# Patient Record
Sex: FEMALE | Race: OTHER | HISPANIC OR LATINO | Employment: OTHER | URBAN - METROPOLITAN AREA
[De-identification: names, ages, dates, MRNs, and addresses within clinical notes are randomized per-mention and may not be internally consistent; named-entity substitution may affect disease eponyms.]

---

## 2017-05-08 ENCOUNTER — OFFICE VISIT (OUTPATIENT)
Dept: MEDICAL GROUP | Facility: PHYSICIAN GROUP | Age: 47
End: 2017-05-08
Payer: COMMERCIAL

## 2017-05-08 VITALS
SYSTOLIC BLOOD PRESSURE: 120 MMHG | HEART RATE: 81 BPM | RESPIRATION RATE: 16 BRPM | OXYGEN SATURATION: 97 % | TEMPERATURE: 97.5 F | HEIGHT: 62 IN | WEIGHT: 150 LBS | DIASTOLIC BLOOD PRESSURE: 60 MMHG | BODY MASS INDEX: 27.6 KG/M2

## 2017-05-08 DIAGNOSIS — N60.02 BILATERAL BREAST CYSTS: ICD-10-CM

## 2017-05-08 DIAGNOSIS — Z13.6 SCREENING FOR CARDIOVASCULAR CONDITION: ICD-10-CM

## 2017-05-08 DIAGNOSIS — K64.9 HEMORRHOIDS, UNSPECIFIED HEMORRHOID TYPE: ICD-10-CM

## 2017-05-08 DIAGNOSIS — Z00.00 ANNUAL PHYSICAL EXAM: ICD-10-CM

## 2017-05-08 DIAGNOSIS — N39.3 STRESS INCONTINENCE: ICD-10-CM

## 2017-05-08 DIAGNOSIS — N60.01 BILATERAL BREAST CYSTS: ICD-10-CM

## 2017-05-08 LAB
APPEARANCE UR: CLEAR
BILIRUB UR STRIP-MCNC: NORMAL MG/DL
COLOR UR AUTO: CLEAR
GLUCOSE UR STRIP.AUTO-MCNC: NORMAL MG/DL
KETONES UR STRIP.AUTO-MCNC: NORMAL MG/DL
LEUKOCYTE ESTERASE UR QL STRIP.AUTO: NORMAL
NITRITE UR QL STRIP.AUTO: NORMAL
PH UR STRIP.AUTO: 7 [PH] (ref 5–8)
PROT UR QL STRIP: NORMAL MG/DL
RBC UR QL AUTO: NORMAL
SP GR UR STRIP.AUTO: 1
UROBILINOGEN UR STRIP-MCNC: NORMAL MG/DL

## 2017-05-08 PROCEDURE — 81002 URINALYSIS NONAUTO W/O SCOPE: CPT | Performed by: FAMILY MEDICINE

## 2017-05-08 PROCEDURE — 99396 PREV VISIT EST AGE 40-64: CPT | Performed by: FAMILY MEDICINE

## 2017-05-08 ASSESSMENT — PATIENT HEALTH QUESTIONNAIRE - PHQ9: CLINICAL INTERPRETATION OF PHQ2 SCORE: 0

## 2017-05-08 NOTE — MR AVS SNAPSHOT
"        Palak Gomez   2017 9:20 AM   Office Visit   MRN: [id number]    Department:  [facility]   Dept Phone:  [phone]    Description:  Female : 1970   Provider:  Pina Moran D.O.           Reason for Visit     Establish Care NU2U    Referral Needed Mammo      Allergies as of 2017     No Known Allergies      You were diagnosed with     Annual physical exam   [078735]       Stress incontinence   [261849]       Hemorrhoids, unspecified hemorrhoid type   [7992480]       Bilateral breast cysts   [6828505]       Encounter for screening mammogram for malignant neoplasm of breast   [487048]       Screening for cardiovascular condition   [149661]         Vital Signs     Blood Pressure Pulse Temperature Respirations Height Weight    120/60 mmHg 81 36.4 °C (97.5 °F) 16 1.575 m (5' 2.01\") 68.04 kg (150 lb)    Body Mass Index Oxygen Saturation Smoking Status             27.43 kg/m2 97% Never Smoker          Basic Information     Date Of Birth Sex Race Ethnicity Preferred Language    1970 Female  or  Unknown English      Problem List              ICD-10-CM Priority Class Noted - Resolved    Bilateral breast cysts N60.01, N60.02   2011 - Present    Gastritis K29.70   2014 - Present      Health Maintenance        Date Due Completion Dates    IMM DTaP/Tdap/Td Vaccine (1 - Tdap) 1988 ---    MAMMOGRAM 2017, 2014, 2013, 2012    PAP SMEAR 2019 (N/S), 10/24/2013 (Done)    Override on 2016: (N/S)    Override on 10/24/2013: Done (normal at [facility])            Results     POCT Urinalysis      Component Value Standard Range & Units    POC Color Clear Negative    POC Appearance Clear Negative    POC Leukocyte Esterase Neg Negative    POC Nitrites Neg Negative    POC Urobiligen Neg Negative (0.2) mg/dL    POC Protein Neg Negative mg/dL    POC Urine PH 7.0 5.0 - 8.0    POC Blood Trace Negative    POC Specific Gravity 1.00 " <1.005 - >1.030    POC Ketones Neg Negative mg/dL    POC Biliruben Neg Negative mg/dL    POC Glucose Neg Negative mg/dL                        Current Immunizations     No immunizations on file.      Below and/or attached are the medications your provider expects you to take. Review all of your home medications and newly ordered medications with your provider and/or pharmacist. Follow medication instructions as directed by your provider and/or pharmacist. Please keep your medication list with you and share with your provider. Update the information when medications are discontinued, doses are changed, or new medications (including over-the-counter products) are added; and carry medication information at all times in the event of emergency situations     Allergies:  No Known Allergies          Medications  Valid as of: May 08, 2017 - 10:33 AM    Generic Name Brand Name Tablet Size Instructions for use    .                 Medicines prescribed today were sent to:     [id number] - [place] - [address] [place] [address]    Phone: [phone] Fax: [phone]    Open 24 Hours?: No      Medication refill instructions:       If your prescription bottle indicates you have medication refills left, it is not necessary to call your provider’s office. Please contact your pharmacy and they will refill your medication.    If your prescription bottle indicates you do not have any refills left, you may request refills at any time through one of the following ways: The online [employer] system (except Urgent Care), by calling your provider’s office, or by asking your pharmacy to contact your provider’s office with a refill request. Medication refills are processed only during regular business hours and may not be available until the next business day. Your provider may request additional information or to have a follow-up visit with you prior to refilling your medication.   *Please Note: Medication refills  are assigned a new Rx number when refilled electronically. Your pharmacy may indicate that no refills were authorized even though a new prescription for the same medication is available at the pharmacy. Please request the medicine by name with the pharmacy before contacting your provider for a refill.        Your To Do List     Future Labs/Procedures Complete By Expires    COMP METABOLIC PANEL  As directed 5/8/2018    LIPID PROFILE  As directed 5/8/2018    MA-DIAGNOSTIC MAMMO W/CAD-BILAT  As directed 5/8/2018      Referral     A referral request has been sent to our patient care coordination department. Please allow 3-5 business days for us to process this request and contact you either by phone or mail. If you do not hear from us by the 5th business day, please call us at [phone].           MyChart Status: Patient Declined

## 2017-05-08 NOTE — PROGRESS NOTES
Subjective:     Chief Complaint   Patient presents with   • Establish Care     NU2U   • Referral Needed     Mammo       Palak Gomez is a 46 y.o. female here today for annual exam.    Pt reports hx of stress incontinence.  Pt reports it has been worsening for the past few days. Patient denies dysuria, urinary frequency, hematuria, change in urine color, malodorous urine, flank pain, suprapubic pain,  fevers or chills.    Pt also reports hemorrhoid that itched without pain or blood.  Pt reports straining with BM occasionally. Patient denies constipation, diarrhea, painful defecation.    LMP: several months prior. No spotting. Hot flashes at night for a few months, but have resolved.     No Known Allergies  Current medicines (including changes today)  No current outpatient prescriptions on file.     No current facility-administered medications for this visit.     Social History   Substance Use Topics   • Smoking status: Never Smoker    • Smokeless tobacco: Never Used      Comment: avid all tobacco products   • Alcohol Use: No     No family status information on file.     Family History   Problem Relation Age of Onset   • Diabetes Neg Hx    • Heart Disease Neg Hx    • Cancer Neg Hx    • Stroke Neg Hx      She    has a past medical history of Gastritis (11/20/2014) and Bilateral breast cysts (6/1/2011). She also has no past medical history of Breast cancer (CMS-[facility]).        ROS   GEN: no weight loss, fevers, or chills  HEENT: no blurry vision or change in vision, no ear pain, no difficulty swallowing, no throat pain, no runny nose, no nasal congestion  Resp: no shortness of breath, no cough  CV: no racing heart, no irregular beats, no chest pain  Abd: no nausea, no vomiting, no diarrhea, no constipation, no blood in stool, no dark stools, no incontinence  : no dysuria, no hematuria, + urinary incontinence, no increased frequency  MSK: no muscle aches, no joint pain, no limited motion  Neuro: no headaches, no  "dizziness, no LOC, no weakness, no numbness/tingling       Objective:     Blood pressure 120/60, pulse 81, temperature 36.4 °C (97.5 °F), resp. rate 16, height 1.575 m (5' 2.01\"), weight 68.04 kg (150 lb), SpO2 97 %. Body mass index is 27.43 kg/(m^2).   Physical Exam:  Constitutional: Alert, no distress.  Skin: Warm, dry, good turgor, no rashes in visible areas.  Eye: Equal, round and reactive, conjunctiva clear, lids normal.  ENMT: Lips without lesions, oropharynx non-erythematous, no exudate, moist oral mucosa, bilateral tympanic membranes: No bulging, no retraction, no fluid, nonerythematous, positive light reflex, external auditory canals: Clear, scant cerumen, nonerythematous  Neck: Trachea midline, no masses, no thyromegaly. No cervical or supraclavicular lymphadenopathy. Full ROM  Respiratory: Unlabored respiratory effort, good air movement, lungs clear to auscultation, no wheezes, no ronchi.  Cardiovascular:RRR, +S1, S2, no murmur, no peripheral edema, pedal and radial pulses equal and intact bilat  Abdomen: Soft, non-tender, no masses, no hepatosplenomegaly, no suprapubic tenderness, no CVA tenderness  Rectal: No bleeding, no discharge, no fissures, +hemorrhoid  MSK:5/5 muscle strength in upper extremities as well as lower extremity bilaterally  Psych: Alert and oriented x3, appropriate affect and mood.  Neuro: CN2-12 intact, no gross motor or sensory deficits      Assessment and Plan:   The following treatment plan was discussed    1. Annual physical exam  - COMP METABOLIC PANEL; Future    2. Stress incontinence  Lab Results   Component Value Date/Time    POC COLOR Clear 05/08/2017 09:52 AM    POC APPEARANCE Clear 05/08/2017 09:52 AM    POC LEUKOCYTE ESTERASE Neg 05/08/2017 09:52 AM    POC NITRITES Neg 05/08/2017 09:52 AM    POC UROBILIGEN Neg 05/08/2017 09:52 AM    POC PROTEIN Neg 05/08/2017 09:52 AM    POC URINE PH 7.0 05/08/2017 09:52 AM    POC BLOOD Trace 05/08/2017 09:52 AM    POC SPECIFIC GRAVITY " 1.00 05/08/2017 09:52 AM    POC KETONES Neg 05/08/2017 09:52 AM    POC BILIRUBEN Neg 05/08/2017 09:52 AM    POC GLUCOSE Neg 05/08/2017 09:52 AM      Trace blood, therefore we will send for culture. If no bacteria recommended repeat UA to reevaluate hematuria. No painful urination, no flank pain, no CVA tenderness. Therefore, nephrolithiasis unlikely patient given home exercises for bladder wall strengthening. Also advised patient to urinate every 2 hours.  - POCT Urinalysis  - COMP METABOLIC PANEL; Future    3. Hemorrhoids, unspecified hemorrhoid type  Patient reports present for many years and requests surgical intervention.  - REFERRAL TO GENERAL SURGERY    4. Bilateral breast cysts  Chronic: Recommend repeat mammogram    5. Encounter for screening mammogram for malignant neoplasm of breast  - MA-DIAGNOSTIC MAMMO W/CAD-BILAT; Future    6. Screening for cardiovascular condition  - LIPID PROFILE; Future      Followup: Return in about 1 year (around 5/8/2018) for Annual.    Please note that this dictation was created using voice recognition software. I have made every reasonable attempt to correct obvious errors, but I expect that there are errors of grammar and possibly content that I did not discover before finalizing the note.

## 2017-05-09 ENCOUNTER — HOSPITAL ENCOUNTER (OUTPATIENT)
Facility: MEDICAL CENTER | Age: 47
End: 2017-05-09
Attending: FAMILY MEDICINE
Payer: COMMERCIAL

## 2017-05-09 DIAGNOSIS — R31.9 HEMATURIA: ICD-10-CM

## 2017-05-09 PROCEDURE — 87086 URINE CULTURE/COLONY COUNT: CPT

## 2017-05-12 ENCOUNTER — TELEPHONE (OUTPATIENT)
Dept: MEDICAL GROUP | Facility: PHYSICIAN GROUP | Age: 47
End: 2017-05-12

## 2017-05-12 LAB
BACTERIA UR CULT: NORMAL
SIGNIFICANT IND 70042: NORMAL
SOURCE SOURCE: NORMAL

## 2017-05-12 NOTE — TELEPHONE ENCOUNTER
----- Message from Tequila Patton M.D. sent at 5/12/2017  9:12 AM PDT -----  Please advise patient that no infection was identified on her urine culture. Her symptoms of incontinence could instead indicate a vaginal infection. I recommend that she schedule a pelvic exam if she continues to have symptoms   Tequila Patton M.D. covering for Pina Moran D.O.

## 2017-05-26 ENCOUNTER — HOSPITAL ENCOUNTER (OUTPATIENT)
Dept: LAB | Facility: MEDICAL CENTER | Age: 47
End: 2017-05-26
Attending: FAMILY MEDICINE
Payer: COMMERCIAL

## 2017-05-26 DIAGNOSIS — Z13.6 SCREENING FOR CARDIOVASCULAR CONDITION: ICD-10-CM

## 2017-05-26 DIAGNOSIS — N39.3 STRESS INCONTINENCE: ICD-10-CM

## 2017-05-26 DIAGNOSIS — Z00.00 ANNUAL PHYSICAL EXAM: ICD-10-CM

## 2017-05-26 LAB
ALBUMIN SERPL BCP-MCNC: 4.2 G/DL (ref 3.2–4.9)
ALBUMIN/GLOB SERPL: 1.2 G/DL
ALP SERPL-CCNC: 43 U/L (ref 30–99)
ALT SERPL-CCNC: 19 U/L (ref 2–50)
ANION GAP SERPL CALC-SCNC: 6 MMOL/L (ref 0–11.9)
AST SERPL-CCNC: 18 U/L (ref 12–45)
BILIRUB SERPL-MCNC: 1.4 MG/DL (ref 0.1–1.5)
BUN SERPL-MCNC: 11 MG/DL (ref 8–22)
CALCIUM SERPL-MCNC: 9.4 MG/DL (ref 8.5–10.5)
CHLORIDE SERPL-SCNC: 103 MMOL/L (ref 96–112)
CHOLEST SERPL-MCNC: 151 MG/DL (ref 100–199)
CO2 SERPL-SCNC: 29 MMOL/L (ref 20–33)
CREAT SERPL-MCNC: 0.58 MG/DL (ref 0.5–1.4)
GFR SERPL CREATININE-BSD FRML MDRD: >60 ML/MIN/1.73 M 2
GLOBULIN SER CALC-MCNC: 3.5 G/DL (ref 1.9–3.5)
GLUCOSE SERPL-MCNC: 85 MG/DL (ref 65–99)
HDLC SERPL-MCNC: 44 MG/DL
LDLC SERPL CALC-MCNC: 93 MG/DL
POTASSIUM SERPL-SCNC: 3.9 MMOL/L (ref 3.6–5.5)
PROT SERPL-MCNC: 7.7 G/DL (ref 6–8.2)
SODIUM SERPL-SCNC: 138 MMOL/L (ref 135–145)
TRIGL SERPL-MCNC: 69 MG/DL (ref 0–149)

## 2017-05-26 PROCEDURE — 36415 COLL VENOUS BLD VENIPUNCTURE: CPT

## 2017-05-26 PROCEDURE — 80061 LIPID PANEL: CPT

## 2017-05-26 PROCEDURE — 80053 COMPREHEN METABOLIC PANEL: CPT

## 2017-05-30 ENCOUNTER — TELEPHONE (OUTPATIENT)
Dept: MEDICAL GROUP | Facility: PHYSICIAN GROUP | Age: 47
End: 2017-05-30

## 2017-05-30 NOTE — TELEPHONE ENCOUNTER
----- Message from Pina Moran D.O. sent at 5/26/2017  4:37 PM PDT -----  Please call pt to inform them that the results from recent testing are all within normal range.     -Dr. Moran

## 2017-06-15 ENCOUNTER — TELEPHONE (OUTPATIENT)
Dept: MEDICAL GROUP | Facility: PHYSICIAN GROUP | Age: 47
End: 2017-06-15

## 2017-06-15 NOTE — TELEPHONE ENCOUNTER
1. Caller Name: Palak                                         Call Back Number: [phone] (home)         Patient approves a detailed voicemail message: N\A    2. Patient is requesting imaging - Mammogram results dated: Dos: 5/30/17 just scanned into chart today.     3. Confirmed results are in chart. Patient advised they will be contacted once interpreted by provider.

## 2017-06-16 NOTE — TELEPHONE ENCOUNTER
Please call patient and advise her: Mammogram shows no signs of mass or cancer. Recommend routine follow-up in one year.

## 2017-06-20 DIAGNOSIS — Z01.812 PRE-OPERATIVE LABORATORY EXAMINATION: ICD-10-CM

## 2017-06-20 LAB
BASOPHILS # BLD AUTO: 0.4 % (ref 0–1.8)
BASOPHILS # BLD: 0.03 K/UL (ref 0–0.12)
EOSINOPHIL # BLD AUTO: 0.15 K/UL (ref 0–0.51)
EOSINOPHIL NFR BLD: 2.2 % (ref 0–6.9)
ERYTHROCYTE [DISTWIDTH] IN BLOOD BY AUTOMATED COUNT: 39.6 FL (ref 35.9–50)
HCG SERPL QL: NEGATIVE
HCT VFR BLD AUTO: 41 % (ref 37–47)
HGB BLD-MCNC: 14.3 G/DL (ref 12–16)
IMM GRANULOCYTES # BLD AUTO: 0.01 K/UL (ref 0–0.11)
IMM GRANULOCYTES NFR BLD AUTO: 0.1 % (ref 0–0.9)
LYMPHOCYTES # BLD AUTO: 1.95 K/UL (ref 1–4.8)
LYMPHOCYTES NFR BLD: 28.8 % (ref 22–41)
MCH RBC QN AUTO: 31.4 PG (ref 27–33)
MCHC RBC AUTO-ENTMCNC: 34.9 G/DL (ref 33.6–35)
MCV RBC AUTO: 90.1 FL (ref 81.4–97.8)
MONOCYTES # BLD AUTO: 0.48 K/UL (ref 0–0.85)
MONOCYTES NFR BLD AUTO: 7.1 % (ref 0–13.4)
NEUTROPHILS # BLD AUTO: 4.15 K/UL (ref 2–7.15)
NEUTROPHILS NFR BLD: 61.4 % (ref 44–72)
NRBC # BLD AUTO: 0 K/UL
NRBC BLD AUTO-RTO: 0 /100 WBC
PLATELET # BLD AUTO: 252 K/UL (ref 164–446)
PMV BLD AUTO: 10.5 FL (ref 9–12.9)
RBC # BLD AUTO: 4.55 M/UL (ref 4.2–5.4)
WBC # BLD AUTO: 6.8 K/UL (ref 4.8–10.8)

## 2017-06-20 PROCEDURE — 85025 COMPLETE CBC W/AUTO DIFF WBC: CPT

## 2017-06-20 PROCEDURE — 84703 CHORIONIC GONADOTROPIN ASSAY: CPT

## 2017-06-20 PROCEDURE — 36415 COLL VENOUS BLD VENIPUNCTURE: CPT

## 2017-06-20 RX ORDER — OMEGA-3 FATTY ACIDS/FISH OIL 300-1000MG
1 CAPSULE ORAL PRN
COMMUNITY
End: 2017-09-22

## 2017-06-22 ENCOUNTER — HOSPITAL ENCOUNTER (OUTPATIENT)
Facility: MEDICAL CENTER | Age: 47
End: 2017-06-22
Attending: SURGERY | Admitting: SURGERY
Payer: COMMERCIAL

## 2017-06-22 VITALS
SYSTOLIC BLOOD PRESSURE: 81 MMHG | OXYGEN SATURATION: 97 % | RESPIRATION RATE: 14 BRPM | DIASTOLIC BLOOD PRESSURE: 54 MMHG | WEIGHT: 145.5 LBS | HEIGHT: 62 IN | BODY MASS INDEX: 26.78 KG/M2 | HEART RATE: 75 BPM | TEMPERATURE: 97.3 F

## 2017-06-22 PROBLEM — K64.4 EXTERNAL HEMORRHOIDS WITHOUT MENTION OF COMPLICATION: Status: ACTIVE | Noted: 2017-06-22

## 2017-06-22 PROCEDURE — 160035 HCHG PACU - 1ST 60 MINS PHASE I: Performed by: SURGERY

## 2017-06-22 PROCEDURE — 160039 HCHG SURGERY MINUTES - EA ADDL 1 MIN LEVEL 3: Performed by: SURGERY

## 2017-06-22 PROCEDURE — 160048 HCHG OR STATISTICAL LEVEL 1-5: Performed by: SURGERY

## 2017-06-22 PROCEDURE — 700111 HCHG RX REV CODE 636 W/ 250 OVERRIDE (IP)

## 2017-06-22 PROCEDURE — 160028 HCHG SURGERY MINUTES - 1ST 30 MINS LEVEL 3: Performed by: SURGERY

## 2017-06-22 PROCEDURE — 500423 HCHG DRESSING, ABD COMBINE: Performed by: SURGERY

## 2017-06-22 PROCEDURE — 160002 HCHG RECOVERY MINUTES (STAT): Performed by: SURGERY

## 2017-06-22 PROCEDURE — 501838 HCHG SUTURE GENERAL: Performed by: SURGERY

## 2017-06-22 PROCEDURE — 700101 HCHG RX REV CODE 250

## 2017-06-22 PROCEDURE — A9270 NON-COVERED ITEM OR SERVICE: HCPCS

## 2017-06-22 PROCEDURE — 700102 HCHG RX REV CODE 250 W/ 637 OVERRIDE(OP)

## 2017-06-22 PROCEDURE — 88304 TISSUE EXAM BY PATHOLOGIST: CPT

## 2017-06-22 PROCEDURE — 160009 HCHG ANES TIME/MIN: Performed by: SURGERY

## 2017-06-22 PROCEDURE — 502240 HCHG MISC OR SUPPLY RC 0272: Performed by: SURGERY

## 2017-06-22 PROCEDURE — 160036 HCHG PACU - EA ADDL 30 MINS PHASE I: Performed by: SURGERY

## 2017-06-22 PROCEDURE — 501422 HCHG SPONGE, SURGIFOAM 100: Performed by: SURGERY

## 2017-06-22 RX ORDER — BUPIVACAINE HYDROCHLORIDE AND EPINEPHRINE 5; 5 MG/ML; UG/ML
INJECTION, SOLUTION EPIDURAL; INTRACAUDAL; PERINEURAL
Status: DISCONTINUED | OUTPATIENT
Start: 2017-06-22 | End: 2017-06-22 | Stop reason: HOSPADM

## 2017-06-22 RX ORDER — BUPIVACAINE HYDROCHLORIDE AND EPINEPHRINE 5; 5 MG/ML; UG/ML
INJECTION, SOLUTION EPIDURAL; INTRACAUDAL; PERINEURAL
Status: DISCONTINUED
Start: 2017-06-22 | End: 2017-06-22 | Stop reason: HOSPADM

## 2017-06-22 RX ORDER — SODIUM CHLORIDE, SODIUM LACTATE, POTASSIUM CHLORIDE, CALCIUM CHLORIDE 600; 310; 30; 20 MG/100ML; MG/100ML; MG/100ML; MG/100ML
1000 INJECTION, SOLUTION INTRAVENOUS
Status: COMPLETED | OUTPATIENT
Start: 2017-06-22 | End: 2017-06-22

## 2017-06-22 RX ORDER — OXYCODONE HYDROCHLORIDE AND ACETAMINOPHEN 5; 325 MG/1; MG/1
1-2 TABLET ORAL EVERY 4 HOURS PRN
Qty: 40 TAB | Refills: 0 | Status: SHIPPED | OUTPATIENT
Start: 2017-06-22 | End: 2017-09-22

## 2017-06-22 RX ORDER — ONDANSETRON 2 MG/ML
4 INJECTION INTRAMUSCULAR; INTRAVENOUS EVERY 4 HOURS PRN
Status: DISCONTINUED | OUTPATIENT
Start: 2017-06-22 | End: 2017-06-22 | Stop reason: HOSPADM

## 2017-06-22 RX ADMIN — SODIUM CHLORIDE, SODIUM LACTATE, POTASSIUM CHLORIDE, CALCIUM CHLORIDE 1000 ML: 600; 310; 30; 20 INJECTION, SOLUTION INTRAVENOUS at 12:50

## 2017-06-22 ASSESSMENT — PAIN SCALES - GENERAL
PAINLEVEL_OUTOF10: 0

## 2017-06-22 NOTE — OR NURSING
1403: Rec'd pt from OR with Dr. Colby, lma present, vss, no distress noted, breathing is even and unlabored,   1412: airway removed, hob elevated, underware in place and rectal packing in place per OR RN, Dr. Corrigan said packing will fall out naturally so no need to remove,    1435: still no c/o pain,   1500: Pt called sister to come p/u, pt doing well, ready to get up and get dressed,   1505: pt dressed and placed in recliner,   1520: sister at bedside, pt doing very well, d/c instructions discussed, iv d/c'd,   1527: pt ambulatory out.

## 2017-06-22 NOTE — OR SURGEON
Immediate Post-Operative Note      PreOp Diagnosis: External hemorrhoid skin tag    PostOp Diagnosis: External hemorrhoid skin tag    Procedure(s):  HEMORRHOIDECTOMY FOR EXCISION EXTERNAL HEMORRHOID/SKIN TAG - Wound Class: Clean     Surgeon(s):  Wesley Corrigan M.D.    Anesthesiologist/Type of Anesthesia:  Anesthesiologist: Wendy Colby M.D./General    Surgical Staff:  Circulator: Kasey Martino R.N.  Scrub Person: Giuliano Harris; Wesley Aldrich    Specimen: Perianal skin tag    Estimated Blood Loss: < 1 cc    Findings: Large skin tag at 12:00 position on external anus    Complications: None        6/22/2017 2:02 PM Wesley Corrigan

## 2017-06-22 NOTE — IP AVS SNAPSHOT
6/22/2017    Palak Lopes  [address] [place] [address]    Dear Palak:    [facility] wants to ensure your discharge home is safe and you or your loved ones have had all of your questions answered regarding your care after you leave the hospital.    Below is a list of resources and contact information should you have any questions regarding your hospital stay, follow-up instructions, or active medical symptoms.    Questions or Concerns Regarding… Contact   Medical Questions Related to Your Discharge  (7 days a week, 8am-5pm) Contact a Nurse Care Coordinator   [phone]   Medical Questions Not Related to Your Discharge  (24 hours a day / 7 days a week)  Contact the Nurse Health Line   [phone]    Medications or Discharge Instructions Refer to your discharge packet   or contact your [facility] Primary Care Provider   [phone]   Follow-up Appointment(s) Schedule your appointment via [employer]   or contact Scheduling [phone]   Billing Review your statement via [employer]  or contact Billing [phone]   Medical Records Review your records via [employer]   or contact Medical Records [phone]     You may receive a telephone call within two days of discharge. This call is to make certain you understand your discharge instructions and have the opportunity to have any questions answered. You can also easily access your medical information, test results and upcoming appointments via the [employer] free online health management tool. You can learn more and sign up at [email or web address]. For assistance setting up your [employer] account, please call [phone].    Once again, we want to ensure your discharge home is safe and that you have a clear understanding of any next steps in your care. If you have any questions or concerns, please do not hesitate to contact us, we are here for you. Thank you for choosing [facility] for your healthcare needs.    Sincerely,    Your [facility] Healthcare Team

## 2017-06-22 NOTE — OP REPORT
DATE OF SERVICE:  06/22/2017    PREOPERATIVE DIAGNOSIS:  Symptomatic external hemorrhoid/skin tag.    POSTOPERATIVE DIAGNOSIS:  Symptomatic external hemorrhoid/skin tag.    INDICATION FOR SURGERY:  This is a 46-year-old female who has had a   longstanding problem with hemorrhoids.  She has a large skin tag, which is   impeding her ability to keep her perianal area clean and was brought to the   operating room today for removal of that hemorrhoid versus skin tag.    PROCEDURE PERFORMED:  Excision of external hemorrhoid/skin tag.    SURGEON:  Wesley Corrigan MD    ASSISTANT:  None.    ANESTHESIOLOGIST:  Wendy Colby MD    ANESTHESIA:  General anesthesia.    FINDINGS:  Moderate to large sized skin tag on the 12 o'clock position of the   anus.    PROCEDURE NARRATIVE:  Signed informed consent was on the chart at the time of   the procedure.  The patient was brought to the operating room and placed in   the supine position.  General anesthesia was induced.  The patient's position   was modified to a lithotomy position.  A timeout was performed.  Skin over her   perirectal and perianal region was prepped and draped in a sterile fashion.    The skin tag was grasped with a grasper and using the Harmonic scalpel, this   was excised at the base of the skin.  Bleeding was noted to be absent.  The   skin tag itself did not have hemorrhoidal tissue within it.  At this time, 10   mL of 0.5% Marcaine with epinephrine were infused into the soft tissue on   either side of the excision.  The site of the excision was closed with 3   separately placed 3-0 chromic sutures.  A Surgicel was smeared with a 50%/50%   mix of 1% lidocaine and Silvadene cream.  This was rolled and placed into the   patient's rectum.  The patient was taken out of lithotomy and placed in mesh   panties with an ABD pad in place.    FLUIDS:  500 mL crystalloid.    ESTIMATED BLOOD LOSS:  1 mL.    DRAINS:  None.    SPECIMENS:  Perirectal skin tag to  pathology.    COMPLICATIONS:  None.    DISPOSITION:  The patient was in stable condition to postanesthesia care unit.       ____________________________________     MD PETRA Corral / ALISA    DD:  06/22/2017 14:15:13  DT:  06/22/2017 16:36:12    D#:  [id number]  Job#:  [id number]

## 2017-06-22 NOTE — IP AVS SNAPSHOT
" Home Care Instructions                                                                                                                Name:Palak Lopes  Medical Record Number:[id number]  CSN: [id number]    YOB: 1970   Age: 46 y.o.  Sex: female  HT:1.575 m (5' 2\") WT: 66 kg (145 lb 8.1 oz)          Admit Date: 6/22/2017     Discharge Date:   Today's Date: 6/22/2017  Attending Doctor:  Wesley Corrigan M.D.                  Allergies:  Review of patient's allergies indicates no known allergies.                Discharge Instructions         ACTIVITY: Rest and take it easy for the first 24 hours.  A responsible adult is recommended to remain with you during that time.  It is normal to feel sleepy.  We encourage you to not do anything that requires balance, judgment or coordination.    MILD FLU-LIKE SYMPTOMS ARE NORMAL. YOU MAY EXPERIENCE GENERALIZED MUSCLE ACHES, THROAT IRRITATION, HEADACHE AND/OR SOME NAUSEA.    FOR 24 HOURS DO NOT:  Drive, operate machinery or run household appliances.  Drink beer or alcoholic beverages.   Make important decisions or sign legal documents.    SPECIAL INSTRUCTIONS: *No lifting restrictions.   Use over the counter laxative of choice if constipated.   Follow up with Dr. Corrigan ([facility], [phone]) as previously appointed, or in 10-14 days.**    DIET: To avoid nausea, slowly advance diet as tolerated, avoiding spicy or greasy foods for the first day.  Add more substantial food to your diet according to your physician's instructions.  Babies can be fed formula or breast milk as soon as they are hungry.  INCREASE FLUIDS AND FIBER TO AVOID CONSTIPATION.    SURGICAL DRESSING/BATHING: Okay to bathe and shower. *    FOLLOW-UP APPOINTMENT:  A follow-up appointment should be arranged with your doctor in *10-14 days*; call to schedule.    You should CALL YOUR PHYSICIAN if you develop:  Fever greater than 101 degrees F.  Pain not relieved by medication, " or persistent nausea or vomiting.  Excessive bleeding (blood soaking through dressing) or unexpected drainage from the wound.  Extreme redness or swelling around the incision site, drainage of pus or foul smelling drainage.  Inability to urinate or empty your bladder within 8 hours.  Problems with breathing or chest pain.    You should call 911 if you develop problems with breathing or chest pain.  If you are unable to contact your doctor or surgical center, you should go to the nearest emergency room or urgent care center.  Physician's telephone #: *[phone]**    If any questions arise, call your doctor.  If your doctor is not available, please feel free to call the Surgical Center at [phone].  The Center is open Monday through Friday from 7AM to 7PM.  You can also call the [facility] HOTLINE open 24 hours/day, 7 days/week and speak to a nurse at [phone], or toll free at [phone].    A registered nurse may call you a few days after your surgery to see how you are doing after your procedure.    MEDICATIONS: Resume taking daily medication.  Take prescribed pain medication with food.  If no medication is prescribed, you may take non-aspirin pain medication if needed.  PAIN MEDICATION CAN BE VERY CONSTIPATING.  Take a stool softener or laxative such as senokot, pericolace, or milk of magnesia if needed.    Prescription given for *PERCOCET**.  Last pain medication given at *__________________**.    If your physician has prescribed pain medication that includes Acetaminophen (Tylenol), do not take additional Acetaminophen (Tylenol) while taking the prescribed medication.    Depression / Suicide Risk    As you are discharged from this [facility] facility, it is important to learn how to keep safe from harming yourself.    Recognize the warning signs:  · Abrupt changes in personality, positive or negative- including increase in energy   · Giving away possessions  · Change in eating patterns-  significant weight changes-  positive or negative  · Change in sleeping patterns- unable to sleep or sleeping all the time   · Unwillingness or inability to communicate  · Depression  · Unusual sadness, discouragement and loneliness  · Talk of wanting to die  · Neglect of personal appearance   · Rebelliousness- reckless behavior  · Withdrawal from people/activities they love  · Confusion- inability to concentrate     If you or a loved one observes any of these behaviors or has concerns about self-harm, here's what you can do:  · Talk about it- your feelings and reasons for harming yourself  · Remove any means that you might use to hurt yourself (examples: pills, rope, extension cords, firearm)  · Get professional help from the community (Mental Health, Substance Abuse, psychological counseling)  · Do not be alone:Call your Safe Contact- someone whom you trust who will be there for you.  · Call your local CRISIS HOTLINE 380-7648 or 762-683-5806  · Call your local Children's Mobile Crisis Response Team [place] (225) 494-2249 or www.[employer]  · Call the toll free National Suicide Prevention Hotlines   · National Suicide Prevention Lifeline 240-477-TLBI (1287)  · National Hope Line Network 800-SUICIDE (999-3429)       Medication List      START taking these medications        Instructions    Morning Afternoon Evening Bedtime    oxycodone-acetaminophen 5-325 MG Tabs   Commonly known as:  PERCOCET        Take 1-2 Tabs by mouth every four hours as needed.   Dose:  1-2 Tab                          CONTINUE taking these medications        Instructions    Morning Afternoon Evening Bedtime    ADVIL 200 MG Caps   Generic drug:  Ibuprofen        Take 1 Cap by mouth as needed.   Dose:  1 Cap                        Collagen 500 MG Caps        Take  by mouth every day.                        Melatonin 1 MG Subl        Place  under tongue.                        METAMUCIL PO        Take  by mouth as needed.                              Where to Get Your Medications      Information about where to get these medications is not yet available     ! Ask your nurse or doctor about these medications    - oxycodone-acetaminophen 5-325 MG Tabs            Medication Information     Above and/or attached are the medications your physician expects you to take upon discharge. Review all of your home medications and newly ordered medications with your doctor and/or pharmacist. Follow medication instructions as directed by your doctor and/or pharmacist. Please keep your medication list with you and share with your physician. Update the information when medications are discontinued, doses are changed, or new medications (including over-the-counter products) are added; and carry medication information at all times in the event of emergency situations.        Resources     Quit Smoking / Tobacco Use:    I understand the use of any tobacco products increases my chance of suffering from future heart disease or stroke and could cause other illnesses which may shorten my life. Quitting the use of tobacco products is the single most important thing I can do to improve my health. For further information on smoking / tobacco cessation call a Toll Free Quit Line at 1-480.896.9679 (*[employer] [facility] [place]) or 1-721.624.6188 (American [employer] Association) or you can access the web based program at www.lungusa.org.    [place] Tobacco Users Help Line:  (472) 489-4407       Toll Free: 1-314.233.5224  Quit Tobacco Program [facility] (848)383-0860    Crisis Hotline:    [place] Crisis Hotline:  7-297-HGTDDYI or 1-103.671.3758    [place] Crisis Hotline:    1-759.687.1807 or 162-116-1664    Discharge Survey:   Thank you for choosing [facility]. We hope we did everything we could to make your hospital stay a pleasant one. You may be receiving a survey and we would appreciate your time and participation in answering the questions. Your input is  very valuable to us in our efforts to improve our service to our patients and their families.            Signatures     My signature on this form indicates that:    1. I acknowledge receipt and understanding of these Home Care Instruction.  2. My questions regarding this information have been answered to my satisfaction.  3. I have formulated a plan with my discharge nurse to obtain my prescribed medications for home.    __________________________________      __________________________________                   Patient Signature                                 Guardian/Responsible Adult Signature      __________________________________                 __________       ________                       Nurse Signature                                               Date                 Time

## 2017-06-22 NOTE — DISCHARGE INSTRUCTIONS
ACTIVITY: Rest and take it easy for the first 24 hours.  A responsible adult is recommended to remain with you during that time.  It is normal to feel sleepy.  We encourage you to not do anything that requires balance, judgment or coordination.    MILD FLU-LIKE SYMPTOMS ARE NORMAL. YOU MAY EXPERIENCE GENERALIZED MUSCLE ACHES, THROAT IRRITATION, HEADACHE AND/OR SOME NAUSEA.    FOR 24 HOURS DO NOT:  Drive, operate machinery or run household appliances.  Drink beer or alcoholic beverages.   Make important decisions or sign legal documents.    SPECIAL INSTRUCTIONS: *No lifting restrictions.   Use over the counter laxative of choice if constipated.   Follow up with Dr. Corrigan ([facility], [phone]) as previously appointed, or in 10-14 days.**    DIET: To avoid nausea, slowly advance diet as tolerated, avoiding spicy or greasy foods for the first day.  Add more substantial food to your diet according to your physician's instructions.  Babies can be fed formula or breast milk as soon as they are hungry.  INCREASE FLUIDS AND FIBER TO AVOID CONSTIPATION.    SURGICAL DRESSING/BATHING: Okay to bathe and shower. *    FOLLOW-UP APPOINTMENT:  A follow-up appointment should be arranged with your doctor in *10-14 days*; call to schedule.    You should CALL YOUR PHYSICIAN if you develop:  Fever greater than 101 degrees F.  Pain not relieved by medication, or persistent nausea or vomiting.  Excessive bleeding (blood soaking through dressing) or unexpected drainage from the wound.  Extreme redness or swelling around the incision site, drainage of pus or foul smelling drainage.  Inability to urinate or empty your bladder within 8 hours.  Problems with breathing or chest pain.    You should call 911 if you develop problems with breathing or chest pain.  If you are unable to contact your doctor or surgical center, you should go to the nearest emergency room or urgent care center.  Physician's telephone #:  *[phone]**    If any questions arise, call your doctor.  If your doctor is not available, please feel free to call the Surgical Center at [phone].  The Center is open Monday through Friday from 7AM to 7PM.  You can also call the HEALTH HOTLINE open 24 hours/day, 7 days/week and speak to a nurse at [phone], or toll free at [phone].    A registered nurse may call you a few days after your surgery to see how you are doing after your procedure.    MEDICATIONS: Resume taking daily medication.  Take prescribed pain medication with food.  If no medication is prescribed, you may take non-aspirin pain medication if needed.  PAIN MEDICATION CAN BE VERY CONSTIPATING.  Take a stool softener or laxative such as senokot, pericolace, or milk of magnesia if needed.    Prescription given for *PERCOCET**.  Last pain medication given at *__________________**.    If your physician has prescribed pain medication that includes Acetaminophen (Tylenol), do not take additional Acetaminophen (Tylenol) while taking the prescribed medication.    Depression / Suicide Risk    As you are discharged from this [facility] facility, it is important to learn how to keep safe from harming yourself.    Recognize the warning signs:  · Abrupt changes in personality, positive or negative- including increase in energy   · Giving away possessions  · Change in eating patterns- significant weight changes-  positive or negative  · Change in sleeping patterns- unable to sleep or sleeping all the time   · Unwillingness or inability to communicate  · Depression  · Unusual sadness, discouragement and loneliness  · Talk of wanting to die  · Neglect of personal appearance   · Rebelliousness- reckless behavior  · Withdrawal from people/activities they love  · Confusion- inability to concentrate     If you or a loved one observes any of these behaviors or has concerns about self-harm, here's what you can do:  · Talk about it- your feelings  and reasons for harming yourself  · Remove any means that you might use to hurt yourself (examples: pills, rope, extension cords, firearm)  · Get professional help from the community (Mental Health, Substance Abuse, psychological counseling)  · Do not be alone:Call your Safe Contact- someone whom you trust who will be there for you.  · Call your local CRISIS HOTLINE 679-4088 or 927-229-8315  · Call your local Children's Mobile Crisis Response Team [place] (314) 424-4095 or www.Sav[employer].com  · Call the toll free National Suicide Prevention Hotlines   · National Suicide Prevention Lifeline 439-183-DJOQ (3356)  · National Hope Line Network 800-SUICIDE (404-3758)

## 2017-09-22 ENCOUNTER — HOSPITAL ENCOUNTER (OUTPATIENT)
Facility: MEDICAL CENTER | Age: 47
End: 2017-09-22
Attending: FAMILY MEDICINE
Payer: COMMERCIAL

## 2017-09-22 ENCOUNTER — OFFICE VISIT (OUTPATIENT)
Dept: MEDICAL GROUP | Facility: PHYSICIAN GROUP | Age: 47
End: 2017-09-22
Payer: COMMERCIAL

## 2017-09-22 VITALS
DIASTOLIC BLOOD PRESSURE: 64 MMHG | HEART RATE: 82 BPM | WEIGHT: 145 LBS | SYSTOLIC BLOOD PRESSURE: 116 MMHG | BODY MASS INDEX: 26.68 KG/M2 | RESPIRATION RATE: 16 BRPM | OXYGEN SATURATION: 96 % | TEMPERATURE: 97.2 F | HEIGHT: 62 IN

## 2017-09-22 DIAGNOSIS — I83.93 VARICOSE VEINS OF BOTH LOWER EXTREMITIES: ICD-10-CM

## 2017-09-22 DIAGNOSIS — Z01.411 ENCOUNTER FOR GYNECOLOGICAL EXAMINATION WITH ABNORMAL FINDING: ICD-10-CM

## 2017-09-22 DIAGNOSIS — Z11.51 SCREENING FOR HPV (HUMAN PAPILLOMAVIRUS): ICD-10-CM

## 2017-09-22 DIAGNOSIS — Z12.4 SCREENING FOR CERVICAL CANCER: ICD-10-CM

## 2017-09-22 DIAGNOSIS — Z30.011 ENCOUNTER FOR INITIAL PRESCRIPTION OF CONTRACEPTIVE PILLS: ICD-10-CM

## 2017-09-22 DIAGNOSIS — N39.3 STRESS INCONTINENCE: ICD-10-CM

## 2017-09-22 PROCEDURE — 87624 HPV HI-RISK TYP POOLED RSLT: CPT

## 2017-09-22 PROCEDURE — 99396 PREV VISIT EST AGE 40-64: CPT | Performed by: FAMILY MEDICINE

## 2017-09-22 PROCEDURE — 88175 CYTOPATH C/V AUTO FLUID REDO: CPT

## 2017-09-22 PROCEDURE — 99000 SPECIMEN HANDLING OFFICE-LAB: CPT | Performed by: FAMILY MEDICINE

## 2017-09-22 RX ORDER — LEVONORGESTREL AND ETHINYL ESTRADIOL 0.1-0.02MG
1 KIT ORAL DAILY
Qty: 28 TAB | Refills: 10 | Status: SHIPPED | OUTPATIENT
Start: 2017-09-22 | End: 2018-07-17 | Stop reason: SDUPTHER

## 2017-09-22 NOTE — PROGRESS NOTES
Subjective:     Chief Complaint   Patient presents with   • Gynecologic Exam     wellness exam        Palak Lopes is a 47 y.o. female here today for well woman exam.    Patient reports irregular periods for the past 6-9 months. Most recent period was 2 months prior. Periods have become less frequent and lighter. Patient is concerned about possible pregnancy requests birth control. Pt reports no spotting between cycles.  She denies vaginal discharge, vaginal pain, or pain with intercourse.    Pt does monthly self breast exams. She denies any lumps or bumps in the breast tissue. She denies any nipple inversion or discharge.      Patient reports a new complaint of bilateral painful varicose veins of both extremities. Patient states they have been probably chronic for approximately 3 years and are worsening.    Patient reports she continues to have stress incontinence. She has done home exercises without improvement.    rNo Known Allergies  Current medicines (including changes today)  Current Outpatient Prescriptions   Medication Sig Dispense Refill   • levonorgestrel-ethinyl estradiol (AVIANE, ALESSE, LESSINA) 0.1-20 MG-MCG per tablet Take 1 Tab by mouth every day. 28 Tab 10   • Psyllium (METAMUCIL PO) Take  by mouth as needed.     • Collagen 500 MG Cap Take  by mouth every day.     • Melatonin 1 MG SL Tab Place  under tongue.       No current facility-administered medications for this visit.      Social History   Substance Use Topics   • Smoking status: Never Smoker   • Smokeless tobacco: Never Used      Comment: avid all tobacco products   • Alcohol use No     No family status information on file.     Family History   Problem Relation Age of Onset   • Diabetes Neg Hx    • Heart Disease Neg Hx    • Cancer Neg Hx    • Stroke Neg Hx      She    has a past medical history of Bilateral breast cysts (6/1/2011); Gastritis (11/20/2014); and Urinary incontinence (6-2017). She also has no past medical history of  "Breast cancer (CMS-HCC).        ROS   GEN: no weight loss, fevers, or chills  HEENT: no blurry vision or change in vision, no ear pain, no difficulty swallowing, no throat pain, no runny nose, no nasal congestion  Resp: no shortness of breath, no cough  CV: no racing heart, no irregular beats, no chest pain  Abd: no nausea, no vomiting, no diarrhea, no constipation, no blood in stool, no dark stools, no incontinence  : no dysuria, no hematuria, no urinary incontinence, no increased frequency         Objective:     Blood pressure 116/64, pulse 82, temperature 36.2 °C (97.2 °F), resp. rate 16, height 1.575 m (5' 2.01\"), weight 65.8 kg (145 lb), SpO2 96 %. Body mass index is 26.51 kg/m².   Physical Exam:  Constitutional: Alert, no distress.  Skin: Warm, dry, good turgor, no rashes in visible areas, telangiectatias  of bilateral upper thighs which patient reports are tender to palpation.  Eye: Equal, round and reactive, conjunctiva clear, lids normal.  ENMT: Lips without lesions, oropharynx non-erythematous, no exudate, moist oral mucosa, bilateral tympanic membranes: No bulging, no retraction, no fluid, nonerythematous, positive light reflex, external auditory canals: Clear, scant cerumen, nonerythematous  Respiratory: Unlabored respiratory effort, good air movement, lungs clear to auscultation, no wheezes, no ronchi.  Cardiovascular:RRR, +S1, S2, no murmur, no peripheral edema, pedal and radial pulses equal and intact bilat  Abdomen: Soft, non-tender, no masses, no hepatosplenomegaly.  Psych: Alert and oriented, appropriate affect and mood.  Neuro: CN2-12 intact, no gross motor or sensory deficits  GYN: normal external female genitalia, no pain with insertion of speculum, vaginal mucosa is pink and well rugated without ulcers or lesions, scant thin white/clear discharge, cervix with os clearly visualized, no discharge from os, no lesions on cervix, no discomfort with sampling.  Bimanual exam: no tenderness, " non-palpable ovaries    Bilateral breast exam:  Pt declines    Assessment and Plan:   The following treatment plan was discussed    1. Screening for cervical cancer  - THINPREP PAP WITH HPV; Future    2. Screening for HPV (human papillomavirus)  - THINPREP PAP WITH HPV; Future    3. Encounter for gynecological examination with abnormal finding  - THINPREP PAP WITH HPV; Future    4. Encounter for initial prescription of contraceptive pills  Discussed all different options of birth control with patient. Pt opted for OCPs. Discussed risks, benefits and side effects of OCPs. Reminded that OCPs are not 100% in birth control and do not protect against STDs so barrier methods are always recommended. Advised against smoking and drinking while taking OCPs as that does increase the risk for stroke. Discussed rapid start vs period start and alerted to potential for break through bleeding in the first 1-2 months. Discussed taking OCPs at roughly the same time every day and how to take a missed pill. Patient understood all information and all questions were answered.  Recommend maximal of one year use.  - levonorgestrel-ethinyl estradiol (AVIANE, ALESSE, LESSINA) 0.1-20 MG-MCG per tablet; Take 1 Tab by mouth every day.  Dispense: 28 Tab; Refill: 10    5. Varicose veins of both lower extremities  Patient requests referral to surgery  - REFERRAL TO VASCULAR SURGERY    6. Stress incontinence  Chronic: No sign of prolapse on exam. Recommend bladder wall physical therapy  - REFERRAL TO PHYSICAL THERAPY Reason for Therapy: Eval/Treat/Report      Followup: Return in about 1 year (around 9/22/2018) for Annual.    Please note that this dictation was created using voice recognition software. I have made every reasonable attempt to correct obvious errors, but I expect that there are errors of grammar and possibly content that I did not discover before finalizing the note.

## 2017-09-23 LAB
CYTOLOGY REG CYTOL: NORMAL
HPV HR 12 DNA CVX QL NAA+PROBE: NEGATIVE
HPV16 DNA SPEC QL NAA+PROBE: NEGATIVE
HPV18 DNA SPEC QL NAA+PROBE: NEGATIVE
SPECIMEN SOURCE: NORMAL

## 2017-11-29 ENCOUNTER — PHYSICAL THERAPY (OUTPATIENT)
Dept: PHYSICAL THERAPY | Facility: MEDICAL CENTER | Age: 47
End: 2017-11-29
Attending: FAMILY MEDICINE
Payer: COMMERCIAL

## 2017-11-29 DIAGNOSIS — N39.3 FEMALE STRESS INCONTINENCE: ICD-10-CM

## 2017-11-29 PROCEDURE — 97163 PT EVAL HIGH COMPLEX 45 MIN: CPT

## 2017-11-29 PROCEDURE — 97110 THERAPEUTIC EXERCISES: CPT

## 2017-11-30 NOTE — OP THERAPY EVALUATION
Outpatient Physical Therapy  PELVIC FLOOR INITIAL EVALUATION    [facility] Outpatient Physical Therapy  [address]  [place] [address]  Phone:  [phone]  Fax:  [phone]    Date of Evaluation: 2017    Patient: Palak Lopes  YOB: 1970  MRN: [id number]     Referring Provider: Pina Eisenberg D.O.  [address] [facility]  [address]  [place] [address]   Referring Diagnosis Stress incontinence (female) (male) [N39.3]     Time Calculation  Start time: 0200  Stop time: 0245 Time Calculation (min): 45 minutes     Physical Therapy Occurrence Codes    Date of onset of impairment:  10/22/17   Date physical therapy care plan established or reviewed:  17   Date physical therapy treatment started:  17          Chief Complaint: Incontinence    Visit Diagnoses     ICD-10-CM   1. Female stress incontinence N39.3       Subjective Palak reports 3 year history of urinary incontinence with sneezing, coughing, jumping, walking fast and with urgency. She wears a light panty liner during the day. She is not having any leakage at night. She does feel some discomfort with intercourse and complains of dryness but she still has a period. She also reports a life long history of constipation until recently after taking metamucil daily. She has a history of one  20 years ago and hemorrhoidectomy. She works in a bakery which requires lots of standing but not too much lifting heavy objects.     Past Medical History:   Diagnosis Date   • Bilateral breast cysts 2011    Present for 10-12 years   • Gastritis 2014   • Urinary incontinence -     Past Surgical History:   Procedure Laterality Date   • HEMORRHOIDECTOMY  2017    Procedure: HEMORRHOIDECTOMY FOR EXCISION EXTERNAL HEMORRHOID/SKIN TAG;  Surgeon: Wesley Corrigan M.D.;  Location: SURGERY SAME DAY [facility];  Service:    • CHOLECYSTECTOMY     • PRIMARY C SECTION     •  US-CYST ASPIRATION-BREAST INITIAL       OB History    Para Term  AB Living   1 1 1         SAB TAB Ectopic Molar Multiple Live Births                    # Outcome Date GA Lbr Acosta/2nd Weight Sex Delivery Anes PTL Lv   1 Term                 Social History   Substance Use Topics   • Smoking status: Never Smoker   • Smokeless tobacco: Never Used      Comment: avid all tobacco products   • Alcohol use No     Family and Occupational History     Social History   • Marital status: Single     Spouse name: N/A   • Number of children: N/A   • Years of education: N/A         Objective   Informed consent for internal examination given    Genitourinary:   Genitourinary Comments: External observation with absence of redness, irritation, abrasions or lesions.   Slight atrophy of labia majora   Palpation   Left   Hypertonic in the compressor urethrae and urethrovaginal muscle.  Right   Hypertonic in the bulbocavernosus, ischiocavernosus, compressor urethrae and urethrovaginal muscle.  Palpation comments   Additional palpation details: Moderate scar tissue adhesion of suprapubic scar    Pelvic floor exam     Vaginal vault size: within normal limits    PFM tone: increased    Voluntary contraction: weak  Manual muscle testing     Left       Superficial: 2      Deep: 2    Right       Superficial: 2      Deep: 2  Muscle endurance     Seconds: 3    Number of repetitions: 3    Pelvic Floor Distress Inventory (PFDI-20)    Pelvic Organ Prolapse Distress Inventory 6 (POPDI-6)    Scale of bother:       Symptoms not present = 0       Bothered: not at all = 1; somewhat = 2; moderately = 3; quite a bit = 4    Does patient...  Usually experience pressure in the lower abdomen?         Score = 2  Usually experience heaviness or dullness in the pelvic area?         Score = 3  Usually have a bulge or something falling out that you can see or feel in the vaginal area?        Score = 0  Ever have to push on the vagina or around  the  rectum to have or complete a bowel movement.         Score = 0  Usually experience a feeling of incomplete bladder emptying?       Score = 0  Ever have to push up on a bulge in the vaginal area with your fingers to start or complete urination?       Score = 0  POPDI-6 Scale Score = 20.83    Colorectal-Anal Distress Inventory 8 (CRAD-8)    Scale of bother:       Symptoms not present = 0       Bothered: not at all = 1; somewhat = 2; moderately = 3; quite a bit = 4    Does patient...  Feel a need to strain too hard to have a bowel movement?       Score = 0  Feel she has not completely emptied her bowels at the end of a bowel movement?       Score = 0  Usually lose stool beyond her control if her stool is well formed?       Score = 0  Usually lose stool beyond her control if her stool is loose?       Score = 0  Usually lose gas from the rectum beyond her control?       Score = 0  Usually has pain when she pass her stool?       Score = 0  Experience a strong sense of urgency and have to rush to the bathroom to have a bowel movement?       Score = 0  Does part of her bowel ever pass through the rectum and bulge outside during or after a bowel movement?       Score = 0  CRAD-8 Score = 0    Urinary Distress Inventory 6 (ANGELICA-6)    Scale of bother:       Symptoms not present = 0       Bothered: not at all = 1; somewhat = 2; moderately = 3; quite a bit = 4    Does patient...  Usually experience frequent urination?       Score = 3    Usually experience urine leakage associated with a feeling of urgency, that is, a strong sensation of needing to go to the bathroom?       Score = 3  Usually experience urine leakage related to coughing, sneezing, or laughing?       Score = 4  Usually experience small amounts of urine leakage (that is, drops)?       Score = 3  Usually experience difficulty emptying your bladder?       Score = 0  Usually experience pain or discomfort in the lower abdomen or genital region?       Score = 0  ANGELICA-6  Score = 54.17    PFDI-20 Summary Score = 75    Exercises and Treatments   Therapeutic exercises     abdominal stretches with skin rolling      Assessment, Goals and Plan   Assessment:     Pelvic floor muscle condition:  Overactive pelvic floor muscles    Rehabilitation potential:  Increased    Assessment details:  Palak is a 47 y.o. Female with c/o urinary incontinence the past 3 years that is getting worse. She demonstrates poor coordination of PFM due to mild overactivity at compressor urethra muscles B and R mild restriction at levator ani and introitus. She also demonstrates moderate scar tissue adhesion that is contributing to her symptoms.     Goals:   Functional Goals     Problem: limited social activities due to urinary incontinence or pain       Goal: social activity not limited by urinary incontinence or pain    Problem: decreased ability for advancing ADL's due to leakage/pain       Goal: increase basic ADL ability due to leakage/pain    Treatment Plan:     Planned therapy interventions:  Bladder training and fluid education, education, manual therapy (CPT 98231), neuromuscular re-education (CPT 83904) and therapeutic exercise (CPT 21196)    Frequency of visits:  1x week    Plan details:  Continue PT 1-2 x week with focus on normalizing PFM and improving PFM strength and coordination.      Functional Limitation G-Codes and Severity Modifiers  Current:     Goal:         Referring provider co-signature:  I have reviewed this plan of care and my co-signature certifies the need for services.  Certification Dates:   From 11/29/17     To 3/1/18    Physician Signature: ________________________________ Date: ______________

## 2017-12-04 ENCOUNTER — PHYSICAL THERAPY (OUTPATIENT)
Dept: PHYSICAL THERAPY | Facility: MEDICAL CENTER | Age: 47
End: 2017-12-04
Attending: FAMILY MEDICINE
Payer: COMMERCIAL

## 2017-12-04 DIAGNOSIS — N39.3 FEMALE STRESS INCONTINENCE: ICD-10-CM

## 2017-12-04 PROCEDURE — 97140 MANUAL THERAPY 1/> REGIONS: CPT

## 2017-12-05 NOTE — OP THERAPY DAILY TREATMENT
Outpatient Physical Therapy  PELVIC FLOOR DAILY TREATMENT     [facility] Outpatient Physical Therapy  [address]  [place] [address]  Phone:  [phone]  Fax:  [phone]    Date: 12/04/2017    Patient: Palak Lopes  YOB: 1970  MRN: [id number]     Time Calculation  Start time: 0330  Stop time: 0415 Time Calculation (min): 45 minutes     Chief Complaint: Incontinence    Visit #: 2    Subjective She has been working on her scar this weekend. No changes in her symptoms.      Objective   Informed consent for internal examination given      Exercises and Treatments   Therapeutic treatments and modalities     Manual therapy, 45 min    Manual therapy external, CTM abdomen, suprapubic region, scar mobs     Manual therapy internal, TrP levator ani, OI, introitus      Assessment, Goals and Plan   Assessment:     Assessment details:  Palak demonstrates improved scar tissue mobility especially on the L. She continues with moderate PFM overactivity and multiple trigger points contributing to her symptoms.    Treatment Plan:     Planned therapy interventions:  Manual therapy (CPT 21070) and neuromuscular re-education (CPT 98084)    Frequency of visits:  1x week    Plan details:  Continue PT

## 2017-12-06 ENCOUNTER — PHYSICAL THERAPY (OUTPATIENT)
Dept: PHYSICAL THERAPY | Facility: MEDICAL CENTER | Age: 47
End: 2017-12-06
Attending: FAMILY MEDICINE
Payer: COMMERCIAL

## 2017-12-06 DIAGNOSIS — N39.3 FEMALE STRESS INCONTINENCE: ICD-10-CM

## 2017-12-06 PROCEDURE — 97140 MANUAL THERAPY 1/> REGIONS: CPT

## 2017-12-07 ENCOUNTER — TELEPHONE (OUTPATIENT)
Dept: PHYSICAL THERAPY | Facility: MEDICAL CENTER | Age: 47
End: 2017-12-07

## 2017-12-07 NOTE — TELEPHONE ENCOUNTER
iVnnie Eisenberg,    I have seen Palak for 4 PT sessions for stress urinary incontinence and she is making great progress. She does have mild to moderate PFM overactivity and pain that is contributing to her symptoms and she reports some pain with intercourse and feelings of dryness. Palak and I have discussed her possibly using a topical estrogen to see if this helps and I wanted to know your opinion?  Thank you so much for the referral to see Palak.      Sincerely,    Abi Saenz, PT, DPT

## 2017-12-11 ENCOUNTER — PHYSICAL THERAPY (OUTPATIENT)
Dept: PHYSICAL THERAPY | Facility: MEDICAL CENTER | Age: 47
End: 2017-12-11
Attending: FAMILY MEDICINE
Payer: COMMERCIAL

## 2017-12-11 DIAGNOSIS — N39.3 FEMALE STRESS INCONTINENCE: ICD-10-CM

## 2017-12-11 PROCEDURE — 97110 THERAPEUTIC EXERCISES: CPT

## 2017-12-12 NOTE — OP THERAPY DAILY TREATMENT
Outpatient Physical Therapy  PELVIC FLOOR DAILY TREATMENT     [facility] Outpatient Physical Therapy  [address]  [place] [address]  Phone:  [phone]  Fax:  [phone]    Date: 12/11/2017    Patient: Palak Lopes  YOB: 1970  MRN: [id number]     Time Calculation  Start time: 0335  Stop time: 0420 Time Calculation (min): 45 minutes     Chief Complaint: Incontinence    Visit #: 4    Subjective She continues to report decreased urinary leakage.      Objective   Informed consent for internal examination given      Exercises and Treatments   Therapeutic exercises   Therapeutic exercises summary: 45 min  Manual stretching of levator ani, OI, introitus to improve pelvic mobility   Improved PFM MMT fro 2/5 to 2+/5       Assessment, Goals and Plan   Assessment:     Assessment details:  Palak continues to make good progress with PT and demonstrates decreased PFM overactivity and improved PFM tissue quality, mobility, and strength.    Treatment Plan:     Planned therapy interventions:  Neuromuscular re-education (CPT 26558) and manual therapy (CPT 70078)    Frequency of visits:  1x week    Plan details:  Continue PT

## 2017-12-13 ENCOUNTER — PHYSICAL THERAPY (OUTPATIENT)
Dept: PHYSICAL THERAPY | Facility: MEDICAL CENTER | Age: 47
End: 2017-12-13
Attending: FAMILY MEDICINE
Payer: COMMERCIAL

## 2017-12-13 DIAGNOSIS — N39.3 FEMALE STRESS INCONTINENCE: ICD-10-CM

## 2017-12-13 PROCEDURE — 97140 MANUAL THERAPY 1/> REGIONS: CPT

## 2017-12-14 NOTE — OP THERAPY DAILY TREATMENT
Outpatient Physical Therapy  PELVIC FLOOR DAILY TREATMENT     [facility] Outpatient Physical Therapy  [address]  [place] [address]  Phone:  [phone]  Fax:  [phone]    Date: 12/13/2017    Patient: Palak Lopes  YOB: 1970  MRN: [id number]     Time Calculation  Start time: 0335  Stop time: 0420 Time Calculation (min): 45 minutes     Chief Complaint: Incontinence    Visit #: 5    Subjective She continues to report decreased urinary leakage.      Objective   Informed consent for internal examination given      Exercises and Treatments   Therapeutic treatments and modalities     Manual therapy, 45 min    Manual therapy external, CTM suprapubic scar, medial thigh, buttocks    Manual therapy internal, TrP levator ani, OI, introitus         Assessment, Goals and Plan   Assessment:     Assessment details:  Palak continues to demonstrate good improvement of suprapubic scar mobility and decreased PFM overactivity and internal trigger points contributing to her symptoms.    Treatment Plan:     Planned therapy interventions:  Manual therapy (CPT 10833) and neuromuscular re-education (CPT 33904)    Frequency of visits:  2x week    Plan details:  Continue PT

## 2017-12-15 ENCOUNTER — APPOINTMENT (OUTPATIENT)
Dept: ADMISSIONS | Facility: MEDICAL CENTER | Age: 47
End: 2017-12-15
Attending: SURGERY
Payer: COMMERCIAL

## 2017-12-18 ENCOUNTER — HOSPITAL ENCOUNTER (OUTPATIENT)
Facility: MEDICAL CENTER | Age: 47
End: 2017-12-18
Attending: SURGERY | Admitting: SURGERY
Payer: COMMERCIAL

## 2017-12-18 VITALS
DIASTOLIC BLOOD PRESSURE: 68 MMHG | SYSTOLIC BLOOD PRESSURE: 101 MMHG | TEMPERATURE: 98.7 F | HEART RATE: 72 BPM | RESPIRATION RATE: 16 BRPM | HEIGHT: 64 IN | WEIGHT: 144.62 LBS | BODY MASS INDEX: 24.69 KG/M2 | OXYGEN SATURATION: 98 %

## 2017-12-18 DIAGNOSIS — D23.9 FIBROUS HISTIOCYTOMA OF SKIN: ICD-10-CM

## 2017-12-18 LAB — HCG SERPL QL: NEGATIVE

## 2017-12-18 PROCEDURE — 84703 CHORIONIC GONADOTROPIN ASSAY: CPT

## 2017-12-18 PROCEDURE — 700101 HCHG RX REV CODE 250

## 2017-12-18 PROCEDURE — 160036 HCHG PACU - EA ADDL 30 MINS PHASE I: Performed by: SURGERY

## 2017-12-18 PROCEDURE — 160028 HCHG SURGERY MINUTES - 1ST 30 MINS LEVEL 3: Performed by: SURGERY

## 2017-12-18 PROCEDURE — 160035 HCHG PACU - 1ST 60 MINS PHASE I: Performed by: SURGERY

## 2017-12-18 PROCEDURE — 700102 HCHG RX REV CODE 250 W/ 637 OVERRIDE(OP)

## 2017-12-18 PROCEDURE — 501838 HCHG SUTURE GENERAL: Performed by: SURGERY

## 2017-12-18 PROCEDURE — 88342 IMHCHEM/IMCYTCHM 1ST ANTB: CPT

## 2017-12-18 PROCEDURE — 700111 HCHG RX REV CODE 636 W/ 250 OVERRIDE (IP)

## 2017-12-18 PROCEDURE — 160048 HCHG OR STATISTICAL LEVEL 1-5: Performed by: SURGERY

## 2017-12-18 PROCEDURE — 88341 IMHCHEM/IMCYTCHM EA ADD ANTB: CPT

## 2017-12-18 PROCEDURE — 160039 HCHG SURGERY MINUTES - EA ADDL 1 MIN LEVEL 3: Performed by: SURGERY

## 2017-12-18 PROCEDURE — A9270 NON-COVERED ITEM OR SERVICE: HCPCS

## 2017-12-18 PROCEDURE — 88305 TISSUE EXAM BY PATHOLOGIST: CPT

## 2017-12-18 PROCEDURE — 160009 HCHG ANES TIME/MIN: Performed by: SURGERY

## 2017-12-18 PROCEDURE — 160002 HCHG RECOVERY MINUTES (STAT): Performed by: SURGERY

## 2017-12-18 RX ORDER — OXYCODONE HYDROCHLORIDE AND ACETAMINOPHEN 5; 325 MG/1; MG/1
TABLET ORAL
Status: DISCONTINUED
Start: 2017-12-18 | End: 2017-12-18 | Stop reason: HOSPADM

## 2017-12-18 RX ORDER — OXYCODONE HYDROCHLORIDE AND ACETAMINOPHEN 5; 325 MG/1; MG/1
1-2 TABLET ORAL EVERY 4 HOURS PRN
Qty: 25 TAB | Refills: 0 | Status: SHIPPED | OUTPATIENT
Start: 2017-12-18 | End: 2017-12-28

## 2017-12-18 RX ORDER — SODIUM CHLORIDE, SODIUM LACTATE, POTASSIUM CHLORIDE, CALCIUM CHLORIDE 600; 310; 30; 20 MG/100ML; MG/100ML; MG/100ML; MG/100ML
INJECTION, SOLUTION INTRAVENOUS CONTINUOUS
Status: DISCONTINUED | OUTPATIENT
Start: 2017-12-18 | End: 2017-12-18 | Stop reason: HOSPADM

## 2017-12-18 RX ORDER — HALOPERIDOL 5 MG/ML
1 INJECTION INTRAMUSCULAR EVERY 6 HOURS PRN
Status: DISCONTINUED | OUTPATIENT
Start: 2017-12-18 | End: 2017-12-18 | Stop reason: HOSPADM

## 2017-12-18 RX ORDER — DEXAMETHASONE SODIUM PHOSPHATE 4 MG/ML
4 INJECTION, SOLUTION INTRA-ARTICULAR; INTRALESIONAL; INTRAMUSCULAR; INTRAVENOUS; SOFT TISSUE
Status: DISCONTINUED | OUTPATIENT
Start: 2017-12-18 | End: 2017-12-18 | Stop reason: HOSPADM

## 2017-12-18 RX ORDER — DIPHENHYDRAMINE HYDROCHLORIDE 50 MG/ML
25 INJECTION INTRAMUSCULAR; INTRAVENOUS EVERY 6 HOURS PRN
Status: DISCONTINUED | OUTPATIENT
Start: 2017-12-18 | End: 2017-12-18 | Stop reason: HOSPADM

## 2017-12-18 RX ORDER — ONDANSETRON 2 MG/ML
4 INJECTION INTRAMUSCULAR; INTRAVENOUS EVERY 4 HOURS PRN
Status: DISCONTINUED | OUTPATIENT
Start: 2017-12-18 | End: 2017-12-18 | Stop reason: HOSPADM

## 2017-12-18 RX ORDER — SCOLOPAMINE TRANSDERMAL SYSTEM 1 MG/1
1 PATCH, EXTENDED RELEASE TRANSDERMAL
Status: DISCONTINUED | OUTPATIENT
Start: 2017-12-18 | End: 2017-12-18 | Stop reason: HOSPADM

## 2017-12-18 RX ORDER — BUPIVACAINE HYDROCHLORIDE AND EPINEPHRINE 5; 5 MG/ML; UG/ML
INJECTION, SOLUTION EPIDURAL; INTRACAUDAL; PERINEURAL
Status: DISCONTINUED | OUTPATIENT
Start: 2017-12-18 | End: 2017-12-18 | Stop reason: HOSPADM

## 2017-12-18 RX ORDER — BUPIVACAINE HYDROCHLORIDE AND EPINEPHRINE 5; 5 MG/ML; UG/ML
INJECTION, SOLUTION EPIDURAL; INTRACAUDAL; PERINEURAL
Status: DISCONTINUED
Start: 2017-12-18 | End: 2017-12-18 | Stop reason: HOSPADM

## 2017-12-18 RX ADMIN — SODIUM CHLORIDE, SODIUM LACTATE, POTASSIUM CHLORIDE, CALCIUM CHLORIDE 1000 ML: 600; 310; 30; 20 INJECTION, SOLUTION INTRAVENOUS at 06:30

## 2017-12-18 ASSESSMENT — PAIN SCALES - GENERAL
PAINLEVEL_OUTOF10: 0

## 2017-12-18 NOTE — DISCHARGE INSTRUCTIONS
ACTIVITY: Rest and take it easy for the first 24 hours.  A responsible adult is recommended to remain with you during that time.  It is normal to feel sleepy.  We encourage you to not do anything that requires balance, judgment or coordination.    MILD FLU-LIKE SYMPTOMS ARE NORMAL. YOU MAY EXPERIENCE GENERALIZED MUSCLE ACHES, THROAT IRRITATION, HEADACHE AND/OR SOME NAUSEA.    FOR 24 HOURS DO NOT:  Drive, operate machinery or run household appliances.  Drink beer or alcoholic beverages.   Make important decisions or sign legal documents.    SPECIAL INSTRUCTIONS: *No lifting restrictions.   Use over the counter laxative of choice if constipated.   Remove dressing(s) and begins showering on 12/21/17.  Okay to sit in bath tub starting today, with left arm kept out of the water.   Follow up with Dr. Corrigan ([facility], [phone]) as previously appointed, or in 10-14 days.**    DIET: To avoid nausea, slowly advance diet as tolerated, avoiding spicy or greasy foods for the first day.  Add more substantial food to your diet according to your physician's instructions.  Babies can be fed formula or breast milk as soon as they are hungry.  INCREASE FLUIDS AND FIBER TO AVOID CONSTIPATION.      You should CALL YOUR PHYSICIAN if you develop:  Fever greater than 101 degrees F.  Pain not relieved by medication, or persistent nausea or vomiting.  Excessive bleeding (blood soaking through dressing) or unexpected drainage from the wound.  Extreme redness or swelling around the incision site, drainage of pus or foul smelling drainage.  Inability to urinate or empty your bladder within 8 hours.  Problems with breathing or chest pain.    You should call 911 if you develop problems with breathing or chest pain.  If you are unable to contact your doctor or surgical center, you should go to the nearest emergency room or urgent care center.  Physician's telephone #: *  Dr Corrigan [phone]    If any questions arise, call  your doctor.  If your doctor is not available, please feel free to call the Surgical Center at [phone].  The Center is open Monday through Friday from 7AM to 7PM.  You can also call the HEALTH HOTLINE open 24 hours/day, 7 days/week and speak to a nurse at   [phone], or toll free at [phone].    A registered nurse may call you a few days after your surgery to see how you are doing after your procedure.    MEDICATIONS: Resume taking daily medication.  Take prescribed pain medication with food.  If no medication is prescribed, you may take non-aspirin pain medication if needed.  PAIN MEDICATION CAN BE VERY CONSTIPATING.  Take a stool softener or laxative such as senokot, pericolace, or milk of magnesia if needed.    Prescription given for *percocet**.  Last pain medication given at **none*.    If your physician has prescribed pain medication that includes Acetaminophen (Tylenol), do not take additional Acetaminophen (Tylenol) while taking the prescribed medication.    Depression / Suicide Risk    As you are discharged from this [facility] facility, it is important to learn how to keep safe from harming yourself.    Recognize the warning signs:  · Abrupt changes in personality, positive or negative- including increase in energy   · Giving away possessions  · Change in eating patterns- significant weight changes-  positive or negative  · Change in sleeping patterns- unable to sleep or sleeping all the time   · Unwillingness or inability to communicate  · Depression  · Unusual sadness, discouragement and loneliness  · Talk of wanting to die  · Neglect of personal appearance   · Rebelliousness- reckless behavior  · Withdrawal from people/activities they love  · Confusion- inability to concentrate     If you or a loved one observes any of these behaviors or has concerns about self-harm, here's what you can do:  · Talk about it- your feelings and reasons for harming yourself  · Remove any means that you  might use to hurt yourself (examples: pills, rope, extension cords, firearm)  · Get professional help from the community (Mental Health, Substance Abuse, psychological counseling)  · Do not be alone:Call your Safe Contact- someone whom you trust who will be there for you.  · Call your local CRISIS HOTLINE 656-1088 or 150-467-3691  · Call your local Children's Mobile Crisis Response Team [place] (564) 326-1584 or www.Engage Mobility  · Call the toll free National Suicide Prevention Hotlines   · National Suicide Prevention Lifeline 112-913-LZUK (8850)  · National Hope Line Network 800-SUICIDE (873-5226)

## 2017-12-18 NOTE — OP REPORT
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    INDICATION FOR SURGERY:  This is a 47-year-old female who has had a hard scar   at the site of a previous vaccination for years.  She was brought to the   operating room approximately 6 weeks ago and underwent excisional biopsy of   this tissue.  This was found to have some unclear pathology, but the pathology   did favor an atypical fibrous histiocytoma, which was positive margin.  She   was brought back to the operating room today for an excision of that lesion.    PROCEDURE:  Reexcision of left upper arm, 2 cm neoplastic skin lesion.    SURGEON:  Wesley Corrigan MD    ASSISTANT:  None.    ANESTHESIOLOGIST:  Florian Palomino MD    ANESTHESIA:  General anesthesia with LMA.    FINDINGS:  Old scar at the site of her excision site without extension into   the deep tissue.    PROCEDURE NARRATIVE:  Signed informed consent was on the chart at the time of   the procedure.  Patient was brought to the operating room and placed in the   supine position.  General anesthesia with LMA was induced.  Patient was   administered 2 g of Ancef IV preoperatively.  Skin over her arm was prepped   and draped in a sterile fashion.  A time-out was performed.  After first   infusing the skin and soft tissue with local anesthetic and marking the   planned excision site to give me at least a 1 cm margin around the old scar at   all positions.  An elliptical incision 6.5 cm x 3 cm was made around the old   scar.  This was performed using a 15 blade scalpel.  The incision was   continued down to a depth of approximately 1.5 cm using almost exclusively   sharp dissection.  The specimen was then marked with a long lateral and short   superior suture and dissected off of the underlying muscle and fascia using   careful Bovie cautery.  The specimen  was sent to pathology.  The wound was   irrigated and dried.  Multiple areas of bleeding were addressed with careful   Bovie cautery.  Once hemostasis was achieved, the tissue was released from its   adhesions to the underlying muscle, so that the skin could be brought to back   together under no tension.  This was accomplished using careful Bovie   cauterization once the skin and soft tissue was able to ligate without   tension.  The Divya's fascia was closed using a series of interrupted 3-0   Vicryl sutures after first infusing the underlying muscle with an additional 8   mL of 0.5% Marcaine.  Once Divya's fascia was closed, the skin was closed   with series of interrupted 3-0 nylon sutures.  The wound was covered with a   folded 2x2 gauze pads followed by Medipore tape.    FLUIDS:  500 mL crystalloid.    ESTIMATED BLOOD LOSS:  3 mL.    COMPLICATIONS:  None.    SPECIMENS:  Left upper arm skin lesion, rule out neoplasia.    COMPLICATIONS:  None.    DISPOSITION:  Patient was in stable condition to postanesthesia care unit.       ____________________________________     MD PETRA Corral / ALISA    DD:  12/18/2017 08:25:07  DT:  12/18/2017 08:40:04    D#:  [id number]  Job#:  [id number]

## 2017-12-18 NOTE — OR SURGEON
Immediate Post OP Note    PreOp Diagnosis:  Atypical fibrous histiocytoma, left upper arm    PostOp Diagnosis:   Atypical fibrous histiocytoma, left upper arm    Procedure(s):  LESION EXCISION GENERAL - FOR RE-EXCISION UPPER ARM 2CM NEOPLASTIC SKIN LESION - Wound Class: Clean    Surgeon(s):  Wesley Corrigan M.D.    Anesthesiologist/Type of Anesthesia:  Anesthesiologist: Florian Palomino M.D./General    Surgical Staff:  Circulator: Kathi Unger R.N.  Scrub Person: William Younger    Specimens:  Left upper arm skin lesion, rule out neoplasia    Estimated Blood Loss: 3 ml    Findings: Scar at old excision site    Complications: *None        12/18/2017 8:13 AM Wesley Corrigan

## 2017-12-18 NOTE — OR NURSING
0817 received from the OR, report from DR Villanueva, s/p re-excision left upper arm lesion, asleep, no OA, breathing unlabored & clear, tegderm & gauze d/i, no s/s bleeding  0930 more awake, denies pain, tolerating sips of water, warm enough, wanting to sleep longer & not wanting visitors at moment  1050 sister present, pt expressing readiness to go home & that she has had very good care today, instructions given, iv d/c'd, home ambulatory & steady on feet

## 2017-12-20 ENCOUNTER — PHYSICAL THERAPY (OUTPATIENT)
Dept: PHYSICAL THERAPY | Facility: MEDICAL CENTER | Age: 47
End: 2017-12-20
Attending: FAMILY MEDICINE
Payer: COMMERCIAL

## 2017-12-20 DIAGNOSIS — N39.3 FEMALE STRESS INCONTINENCE: ICD-10-CM

## 2017-12-20 PROCEDURE — 97140 MANUAL THERAPY 1/> REGIONS: CPT

## 2017-12-20 NOTE — OP THERAPY DAILY TREATMENT
Outpatient Physical Therapy  PELVIC FLOOR DAILY TREATMENT     [facility] Outpatient Physical Therapy  [address]  [place] [address]  Phone:  [phone]  Fax:  [phone]    Date: 12/20/2017    Patient: Palak Lopes  YOB: 1970  MRN: [id number]     Time Calculation  Start time: 0330  Stop time: 0410 Time Calculation (min): 40 minutes     Chief Complaint: Incontinence    Visit #: 6    Subjective She had a small amount of incontinence after having L arm surgery and she was under anesthesia. She has not had any more incidences since then. She also has started her period and would like to skip internal work today.      Objective     Exercises and Treatments   Therapeutic treatments and modalities     Manual therapy, 40 min    Manual therapy external, CTM abdomen, suprapubic scar mobs         Assessment, Goals and Plan   Assessment:     Assessment details:  Palak continues with good improvement of suprapubic scar tissue mobility with minimal restriction.    Treatment Plan:     Planned therapy interventions:  Manual therapy (CPT 48821) and neuromuscular re-education (CPT 63983)    Frequency of visits:  2x week    Plan details:  Continue PFM PT

## 2017-12-27 ENCOUNTER — PHYSICAL THERAPY (OUTPATIENT)
Dept: PHYSICAL THERAPY | Facility: MEDICAL CENTER | Age: 47
End: 2017-12-27
Attending: FAMILY MEDICINE
Payer: COMMERCIAL

## 2017-12-27 DIAGNOSIS — N39.3 FEMALE STRESS INCONTINENCE: ICD-10-CM

## 2017-12-27 PROCEDURE — 97140 MANUAL THERAPY 1/> REGIONS: CPT

## 2017-12-28 NOTE — OP THERAPY DAILY TREATMENT
Outpatient Physical Therapy  PELVIC FLOOR DAILY TREATMENT     [facility] Outpatient Physical Therapy  [address]  [place] [address]  Phone:  [phone]  Fax:  [phone]    Date: 12/27/2017    Patient: Palak Lopes  YOB: 1970  MRN: [id number]     Time Calculation  Start time: 0335  Stop time: 0420 Time Calculation (min): 45 minutes     Chief Complaint: Incontinence    Visit #: 7    Subjective She is only having leakage if her bladder is very full and she coughs or sneezes.      Objective   Informed consent for internal examination given      Exercises and Treatments   Therapeutic treatments and modalities     Manual therapy, 45 min, CTM and scar mobs suprapubic scar, TrP/MFR levator ani, OI, introitus         Assessment, Goals and Plan   Assessment:     Assessment details:  Scar tissue adhesion and PFM tissue quality and mobility with min to mod restrictions.    Treatment Plan:     Planned therapy interventions:  Manual therapy (CPT 09384) and neuromuscular re-education (CPT 84094)    Frequency of visits:  2x week    Plan details:  Continue PT

## 2018-01-02 ENCOUNTER — PHYSICAL THERAPY (OUTPATIENT)
Dept: PHYSICAL THERAPY | Facility: MEDICAL CENTER | Age: 48
End: 2018-01-02
Attending: FAMILY MEDICINE
Payer: COMMERCIAL

## 2018-01-02 DIAGNOSIS — N39.3 FEMALE STRESS INCONTINENCE: ICD-10-CM

## 2018-01-02 PROCEDURE — 97110 THERAPEUTIC EXERCISES: CPT

## 2018-01-02 NOTE — OP THERAPY DAILY TREATMENT
Outpatient Physical Therapy  PELVIC FLOOR DAILY TREATMENT     [facility] Outpatient Physical Therapy  [address]  [place] [address]  Phone:  [phone]  Fax:  [phone]    Date: 01/02/2018    Patient: Palak Lopes  YOB: 1970  MRN: [id number]     Time Calculation  Start time: 0325  Stop time: 0400 Time Calculation (min): 35 minutes     Chief Complaint: Incontinence    Visit #: 8    Subjective she had a small amount of leakage during coughing and sneezing and she has not had to wear a pad since beginning PT.      Objective   Informed consent for internal examination given      Exercises and Treatments   Therapeutic treatments and modalities     Therapeutic exercises, x 35 min PFM contraction with emphasis on decreased valsalva, 5 sec hold, 10 sec rest x 15 reps, VC to lift a kidney bean, not a couch, PFM contraction with quick flicks, 2 sec hold/4 sec rest x 15 reps         Assessment, Goals and Plan   Assessment:     Assessment details:  Palak has made good progress with PT and is able to correctly contract PFM with endurance and quick flick exercises.     Treatment Plan:     Planned therapy interventions:  Neuromuscular re-education (CPT 45707) and therapeutic exercise (CPT 28942)    Frequency of visits:  2x week    Plan details:  Continue PT

## 2018-01-04 ENCOUNTER — PHYSICAL THERAPY (OUTPATIENT)
Dept: PHYSICAL THERAPY | Facility: MEDICAL CENTER | Age: 48
End: 2018-01-04
Attending: FAMILY MEDICINE
Payer: COMMERCIAL

## 2018-01-04 DIAGNOSIS — N39.3 FEMALE STRESS INCONTINENCE: ICD-10-CM

## 2018-01-04 PROCEDURE — 97140 MANUAL THERAPY 1/> REGIONS: CPT

## 2018-01-05 NOTE — OP THERAPY DAILY TREATMENT
Outpatient Physical Therapy  PELVIC FLOOR DAILY TREATMENT     [facility] Outpatient Physical Therapy  [address]  [place] [address]  Phone:  [phone]  Fax:  [phone]    Date: 01/04/2018    Patient: Palak Lopes  YOB: 1970  MRN: [id number]     Time Calculation  Start time: 0335  Stop time: 0415 Time Calculation (min): 40 minutes     Chief Complaint: Incontinence    Visit #: 9    Subjective She had increased leakage but she was very stressed this morning and this has happened with increased stress before. She has been doing the PFMT and has not had any problems.      Objective   Informed consent for internal examination given      Exercises and Treatments   Therapeutic treatments and modalities     Manual therapy, 40  min    Manual therapy external, CTM abdomen, suprapubic scar    Manual therapy internal, TrP levator ani, oi, introitus         Assessment, Goals and Plan   Assessment:     Assessment details:  Palak continues to demonstrate improved PFM tissue quality and mobility.    Treatment Plan:     Planned therapy interventions:  Neuromuscular re-education (CPT 68499) and manual therapy (CPT 80925)    Frequency of visits:  1x week    Plan details:  Continue PT

## 2018-01-09 ENCOUNTER — PHYSICAL THERAPY (OUTPATIENT)
Dept: PHYSICAL THERAPY | Facility: MEDICAL CENTER | Age: 48
End: 2018-01-09
Attending: FAMILY MEDICINE
Payer: COMMERCIAL

## 2018-01-09 DIAGNOSIS — N39.3 FEMALE STRESS INCONTINENCE: ICD-10-CM

## 2018-01-09 PROCEDURE — 97110 THERAPEUTIC EXERCISES: CPT

## 2018-01-10 NOTE — OP THERAPY DAILY TREATMENT
Outpatient Physical Therapy  PELVIC FLOOR DAILY TREATMENT     [facility] Outpatient Physical Therapy  [address]  [place] [address]  Phone:  [phone]  Fax:  [phone]    Date: 01/09/2018    Patient: Palak Lopes  YOB: 1970  MRN: [id number]     Time Calculation  Start time: 0330  Stop time: 0415 Time Calculation (min): 45 minutes     Chief Complaint: Incontinence    Visit #: 10    Subjective she is doing really well and has not had any leakage since the last session and she started the PFM exercises.      Objective   Informed consent for internal examination given      Exercises and Treatments   Therapeutic treatments and modalities     Neuromuscular re-education, PFM contraction in sitting 5 sec hold/10 sec rest, PFM contraction in standing 5 sec hold/ 10 sec rest, MFR levator ani         Assessment, Goals and Plan   Assessment:     Assessment details:  Palak is making good progress and is able to verbally confirm she is catalina PFM in sitting and standing and she does not appear to be straining.     Treatment Plan:     Planned therapy interventions:  Neuromuscular re-education (CPT 43943)    Frequency of visits:  1x week    Plan details:  She is spend 1 week on HEP and return for follow up.

## 2018-01-11 ENCOUNTER — APPOINTMENT (OUTPATIENT)
Dept: PHYSICAL THERAPY | Facility: MEDICAL CENTER | Age: 48
End: 2018-01-11
Attending: FAMILY MEDICINE
Payer: COMMERCIAL

## 2018-01-18 ENCOUNTER — PHYSICAL THERAPY (OUTPATIENT)
Dept: PHYSICAL THERAPY | Facility: MEDICAL CENTER | Age: 48
End: 2018-01-18
Attending: FAMILY MEDICINE
Payer: COMMERCIAL

## 2018-01-18 DIAGNOSIS — N39.3 FEMALE STRESS INCONTINENCE: ICD-10-CM

## 2018-01-18 PROCEDURE — 97110 THERAPEUTIC EXERCISES: CPT

## 2018-01-19 NOTE — OP THERAPY DAILY TREATMENT
Outpatient Physical Therapy  PELVIC FLOOR DAILY TREATMENT     [facility] Outpatient Physical Therapy  [address]  [place] [address]  Phone:  [phone]  Fax:  [phone]    Date: 01/18/2018    Patient: Palak Lopes  YOB: 1970  MRN: [id number]     Time Calculation             Chief Complaint: Incontinence    Visit #: 11    Subjective She has not had any leakage since last session and has been doing her exercises daily. She is very happy with her progress.      Objective     Exercises and Treatments   Therapeutic exercises     40 min    Pfilates: bridges, squats, sidelying hip abd, modified pushups , 10 reps each, 10 reps quick flicks with bridges and squats         Assessment, Goals and Plan   Assessment:     Assessment details:  Palak tolerated progression of program and was able to correctly perform each exercise.     Treatment Plan:     Planned therapy interventions:  Therapeutic exercise (CPT 35678)    Plan details:  Pt to be discharged from PT.

## 2018-01-19 NOTE — OP THERAPY DISCHARGE SUMMARY
Outpatient Physical Therapy  PELVIC FLOOR DISCHARGE SUMMARY NOTE      [facility] Outpatient Physical Therapy  [address]  [place] [address]  Phone:  [phone]  Fax:  [phone]    Date of Visit: 01/18/2018    Patient: Palak Lopes  YOB: 1970  MRN: [id number]     Referring Provider: Pina Eisenberg D.O.  [address]  [address]  [place] [address]   Referring Diagnosis: Stress incontinence (female) (male) [N39.3]     Visit #: 11    Time Calculation  Start time: 0145  Stop time: 0225 Time Calculation (min): 40 minutes     Physical Therapy Occurrence Codes    Date of onset of impairment:  10/22/17   Date physical therapy care plan established or reviewed:  11/29/17   Date physical therapy treatment started:  11/29/17          Chief Complaint: Incontinence    Visit Diagnoses     ICD-10-CM   1. Female stress incontinence N39.3       Subjective Palak has attended PT for 11 sessions and reports all of her incontinence issues have resolved.     Objective   PFM MMT 3/5 improved from 2/5   Muscle endurance 10 sec improved from 3 sec  Palpation: minimal to no PFM overactivity improved from mild PFM overactivity  Minimal to no scar tissue adhesion suprapubic region improved from moderate scar tissue adhesion.  Assessment, Goals and Plan   Assessment:     Assessment details:  Palak has made great improvement with PT and demonstrates improved PFM strength and improved tissue quality and mobility. She is appropriate for discharge from PT and to be independent with HEP.    Goals:   Functional Goals     Problem: limited social activities due to urinary incontinence or pain       Goal: social activity not limited by urinary incontinence or pain       Status: met       Date met: 1/18/2018    Problem: decreased ability for advancing ADL's due to leakage/pain       Goal: increased advanced ADL ability due to leakage/pain       Status: met       Date met:  1/18/2018    Treatment Plan:     Plan details:  Pt to be discharged from PT      Functional Limitation G-Codes and Severity Modifiers     Current:     Goal:     Discharge:

## 2018-02-15 ENCOUNTER — OFFICE VISIT (OUTPATIENT)
Dept: MEDICAL GROUP | Facility: CLINIC | Age: 48
End: 2018-02-15
Payer: COMMERCIAL

## 2018-02-15 ENCOUNTER — HOSPITAL ENCOUNTER (OUTPATIENT)
Facility: MEDICAL CENTER | Age: 48
End: 2018-02-15
Attending: INTERNAL MEDICINE
Payer: COMMERCIAL

## 2018-02-15 VITALS
OXYGEN SATURATION: 98 % | SYSTOLIC BLOOD PRESSURE: 110 MMHG | RESPIRATION RATE: 16 BRPM | WEIGHT: 146 LBS | BODY MASS INDEX: 26.87 KG/M2 | HEIGHT: 62 IN | TEMPERATURE: 98.8 F | DIASTOLIC BLOOD PRESSURE: 70 MMHG | HEART RATE: 88 BPM

## 2018-02-15 DIAGNOSIS — R30.0 DYSURIA: ICD-10-CM

## 2018-02-15 LAB
APPEARANCE UR: ABNORMAL
APPEARANCE UR: CLEAR
BACTERIA #/AREA URNS HPF: ABNORMAL /HPF
BILIRUB UR QL STRIP.AUTO: NEGATIVE
BILIRUB UR STRIP-MCNC: ABNORMAL MG/DL
COLOR UR AUTO: YELLOW
COLOR UR: YELLOW
CULTURE IF INDICATED INDCX: YES UA CULTURE
EPI CELLS #/AREA URNS HPF: ABNORMAL /HPF
GLUCOSE UR STRIP.AUTO-MCNC: ABNORMAL MG/DL
GLUCOSE UR STRIP.AUTO-MCNC: NEGATIVE MG/DL
HYALINE CASTS #/AREA URNS LPF: ABNORMAL /LPF
KETONES UR STRIP.AUTO-MCNC: ABNORMAL MG/DL
KETONES UR STRIP.AUTO-MCNC: NEGATIVE MG/DL
LEUKOCYTE ESTERASE UR QL STRIP.AUTO: ABNORMAL
LEUKOCYTE ESTERASE UR QL STRIP.AUTO: ABNORMAL
MICRO URNS: ABNORMAL
NITRITE UR QL STRIP.AUTO: ABNORMAL
NITRITE UR QL STRIP.AUTO: NEGATIVE
PH UR STRIP.AUTO: 6 [PH]
PH UR STRIP.AUTO: 6 [PH] (ref 5–8)
PROT UR QL STRIP: ABNORMAL MG/DL
PROT UR QL STRIP: NEGATIVE MG/DL
RBC # URNS HPF: ABNORMAL /HPF
RBC UR QL AUTO: ABNORMAL
RBC UR QL AUTO: ABNORMAL
SP GR UR STRIP.AUTO: 1.01
SP GR UR STRIP.AUTO: 1.01
UROBILINOGEN UR STRIP-MCNC: 0.2 MG/DL
UROBILINOGEN UR STRIP.AUTO-MCNC: 0.2 MG/DL
WBC #/AREA URNS HPF: ABNORMAL /HPF

## 2018-02-15 PROCEDURE — 99000 SPECIMEN HANDLING OFFICE-LAB: CPT | Performed by: INTERNAL MEDICINE

## 2018-02-15 PROCEDURE — 99213 OFFICE O/P EST LOW 20 MIN: CPT | Performed by: INTERNAL MEDICINE

## 2018-02-15 PROCEDURE — 87086 URINE CULTURE/COLONY COUNT: CPT

## 2018-02-15 PROCEDURE — 81001 URINALYSIS AUTO W/SCOPE: CPT

## 2018-02-15 PROCEDURE — 81002 URINALYSIS NONAUTO W/O SCOPE: CPT | Performed by: INTERNAL MEDICINE

## 2018-02-15 RX ORDER — PHENAZOPYRIDINE HYDROCHLORIDE 200 MG/1
200 TABLET, FILM COATED ORAL 3 TIMES DAILY PRN
Qty: 6 TAB | Refills: 0 | Status: SHIPPED | OUTPATIENT
Start: 2018-02-15 | End: 2018-06-12

## 2018-02-15 RX ORDER — NITROFURANTOIN 25; 75 MG/1; MG/1
100 CAPSULE ORAL 2 TIMES DAILY
Qty: 10 CAP | Refills: 0 | Status: SHIPPED | OUTPATIENT
Start: 2018-02-15 | End: 2018-06-12

## 2018-02-15 NOTE — PROGRESS NOTES
CC: Palak Lopes is a 47 y.o. female is suffering from   Chief Complaint   Patient presents with   • Dysuria         SUBJECTIVE:  1. Dysuria  Palak is here for follow-up, has a history of dysuria. Patient states she's had a 2 day history of this states there is pain burning increased frequency        Past social, family, history:   Social History   Substance Use Topics   • Smoking status: Never Smoker   • Smokeless tobacco: Never Used      Comment: avid all tobacco products   • Alcohol use No         MEDICATIONS:    Current Outpatient Prescriptions:   •  nitrofurantoin monohydr macro (MACROBID) 100 MG Cap, Take 1 Cap by mouth 2 times a day., Disp: 10 Cap, Rfl: 0  •  phenazopyridine (PYRIDIUM) 200 MG Tab, Take 1 Tab by mouth 3 times a day as needed., Disp: 6 Tab, Rfl: 0  •  levonorgestrel-ethinyl estradiol (AVIANE, ALESSE, LESSINA) 0.1-20 MG-MCG per tablet, Take 1 Tab by mouth every day., Disp: 28 Tab, Rfl: 10  •  Melatonin 1 MG SL Tab, Place  under tongue., Disp: , Rfl:   •  Collagen 500 MG Cap, Take  by mouth every day., Disp: , Rfl:     PROBLEMS:  Patient Active Problem List    Diagnosis Date Noted   • Fibrous histiocytoma of skin 12/18/2017   • Stress incontinence 09/22/2017   • Varicose veins of both lower extremities 09/22/2017   • External hemorrhoids without mention of complication 06/22/2017   • Gastritis 11/20/2014   • Bilateral breast cysts 06/01/2011       REVIEW OF SYSTEMS:  Gen.:  No Nausea, Vomiting, fever, Chills.  Heart: No chest pain.  Lungs:  No shortness of Breath.  Psychological: Enrique unusual Anxiety depression     PHYSICAL EXAM   Constitutional: Alert, cooperative, not in acute distress.  Cardiovascular:  Rate Rhythm is regular without murmurs rubs clicks.     Thorax & Lungs: Clear to auscultation, no wheezing, rhonchi, or rales  HENT: Normocephalic, Atraumatic.  Eyes: PERRLA, EOMI, Conjunctiva normal.   Neck: Trachia is midline no swelling of the thyroid.   Lymphatic: No  "lymphadenopathy noted.   Neurologic: Alert & oriented x 3, cranial nerves II through XII are intact, Normal motor function, Normal sensory function, No focal deficits noted.   Psychiatric: Affect normal, Judgment normal, Mood normal.     VITAL SIGNS:/70   Pulse 88   Temp 37.1 °C (98.8 °F)   Resp 16   Ht 1.575 m (5' 2.01\")   Wt 66.2 kg (146 lb)   SpO2 98%   Breastfeeding? No   BMI 26.70 kg/m²     Labs: Reviewed    Assessment:                                                     Plan:    1. Dysuria  Positive for leukocyte esterase blood consistent with UTI  - POCT Urinalysis  - URINALYSIS,CULTURE IF INDICATED; Future  - POCT Urinalysis  - nitrofurantoin monohydr macro (MACROBID) 100 MG Cap; Take 1 Cap by mouth 2 times a day.  Dispense: 10 Cap; Refill: 0  - phenazopyridine (PYRIDIUM) 200 MG Tab; Take 1 Tab by mouth 3 times a day as needed.  Dispense: 6 Tab; Refill: 0        "

## 2018-02-17 LAB
BACTERIA UR CULT: NORMAL
SIGNIFICANT IND 70042: NORMAL
SITE SITE: NORMAL
SOURCE SOURCE: NORMAL

## 2018-02-23 ENCOUNTER — APPOINTMENT (OUTPATIENT)
Dept: MEDICAL GROUP | Facility: PHYSICIAN GROUP | Age: 48
End: 2018-02-23
Payer: COMMERCIAL

## 2018-03-13 ENCOUNTER — OFFICE VISIT (OUTPATIENT)
Dept: MEDICAL GROUP | Facility: PHYSICIAN GROUP | Age: 48
End: 2018-03-13
Payer: COMMERCIAL

## 2018-03-13 VITALS
BODY MASS INDEX: 27.05 KG/M2 | RESPIRATION RATE: 16 BRPM | DIASTOLIC BLOOD PRESSURE: 78 MMHG | OXYGEN SATURATION: 98 % | HEART RATE: 89 BPM | WEIGHT: 147 LBS | TEMPERATURE: 98.6 F | HEIGHT: 62 IN | SYSTOLIC BLOOD PRESSURE: 112 MMHG

## 2018-03-13 DIAGNOSIS — I83.93 VARICOSE VEINS OF BOTH LOWER EXTREMITIES: ICD-10-CM

## 2018-03-13 DIAGNOSIS — R31.9 HEMATURIA, UNSPECIFIED TYPE: ICD-10-CM

## 2018-03-13 DIAGNOSIS — D23.9 FIBROUS HISTIOCYTOMA OF SKIN: ICD-10-CM

## 2018-03-13 DIAGNOSIS — N60.02 BILATERAL BREAST CYSTS: ICD-10-CM

## 2018-03-13 DIAGNOSIS — N60.01 BILATERAL BREAST CYSTS: ICD-10-CM

## 2018-03-13 DIAGNOSIS — N39.3 STRESS INCONTINENCE: ICD-10-CM

## 2018-03-13 PROBLEM — K64.4 EXTERNAL HEMORRHOIDS: Status: RESOLVED | Noted: 2017-06-22 | Resolved: 2018-03-13

## 2018-03-13 LAB
APPEARANCE UR: CLEAR
BILIRUB UR STRIP-MCNC: NORMAL MG/DL
COLOR UR AUTO: YELLOW
GLUCOSE UR STRIP.AUTO-MCNC: NORMAL MG/DL
KETONES UR STRIP.AUTO-MCNC: NORMAL MG/DL
LEUKOCYTE ESTERASE UR QL STRIP.AUTO: NORMAL
NITRITE UR QL STRIP.AUTO: NORMAL
PH UR STRIP.AUTO: 6.5 [PH] (ref 5–8)
PROT UR QL STRIP: NORMAL MG/DL
RBC UR QL AUTO: NORMAL
SP GR UR STRIP.AUTO: 1.01
UROBILINOGEN UR STRIP-MCNC: 0.2 MG/DL

## 2018-03-13 PROCEDURE — 99214 OFFICE O/P EST MOD 30 MIN: CPT | Performed by: NURSE PRACTITIONER

## 2018-03-13 PROCEDURE — 81002 URINALYSIS NONAUTO W/O SCOPE: CPT | Performed by: NURSE PRACTITIONER

## 2018-03-13 ASSESSMENT — PAIN SCALES - GENERAL: PAINLEVEL: NO PAIN

## 2018-03-13 NOTE — PROGRESS NOTES
Chief Complaint   Patient presents with   • Establish Care     New Patient    • UTI     results    • Other     bumb on vagina x 3 weeks        HISTORY OF THE PRESENT ILLNESS: This is a 47 y.o. female new patient to our practice. This pleasant patient is here today to discuss multiple issues.    Fibrous histiocytoma of skin  Removed by Dr. Corrigan, 12/18/17. Patient is due for follow-up.     Bilateral breast cysts  Chronic in nature. Stable. Mammogram is due in May.     External hemorrhoids without mention of complication  Chronic     Stress incontinence  Chronic in nature. Improved. Is continuing exercise which improves symptoms.     Varicose veins of both lower extremities  Chronic in nature. Stable. States that she has pain in her legs, states she will have to rub them to make them feel better. States that she will sometimes she will notice swelling in her feet, will follow-up with Dr. Corrigan.       Past Medical History:   Diagnosis Date   • Bilateral breast cysts 6/1/2011    Present for 10-12 years   • Gastritis 11/20/2014   • Urinary incontinence 6-2017       Past Surgical History:   Procedure Laterality Date   • LESION EXCISION GENERAL Left 12/18/2017    Procedure: LESION EXCISION GENERAL - FOR RE-EXCISION UPPER ARM 2CM NEOPLASTIC SKIN LESION;  Surgeon: Wesley Corrigan M.D.;  Location: SURGERY SAME DAY [facility];  Service: General   • HEMORRHOIDECTOMY  6/22/2017    Procedure: HEMORRHOIDECTOMY FOR EXCISION EXTERNAL HEMORRHOID/SKIN TAG;  Surgeon: Wesley Corrigan M.D.;  Location: SURGERY SAME DAY [facility];  Service:    • CHOLECYSTECTOMY     • PRIMARY C SECTION     • US-CYST ASPIRATION-BREAST INITIAL         No family status information on file.     Family History   Problem Relation Age of Onset   • Diabetes Neg Hx    • Heart Disease Neg Hx    • Cancer Neg Hx    • Stroke Neg Hx        Social History   Substance Use Topics   • Smoking status: Never Smoker   • Smokeless tobacco: Never Used      " Comment: avid all tobacco products   • Alcohol use No       Allergies: Patient has no known allergies.    Current Outpatient Prescriptions Ordered in [facility]   Medication Sig Dispense Refill   • nitrofurantoin monohydr macro (MACROBID) 100 MG Cap Take 1 Cap by mouth 2 times a day. 10 Cap 0   • phenazopyridine (PYRIDIUM) 200 MG Tab Take 1 Tab by mouth 3 times a day as needed. 6 Tab 0   • levonorgestrel-ethinyl estradiol (AVIANE, ALESSE, LESSINA) 0.1-20 MG-MCG per tablet Take 1 Tab by mouth every day. 28 Tab 10   • Melatonin 1 MG SL Tab Place  under tongue.     • Collagen 500 MG Cap Take  by mouth every day.       No current [facility]-ordered facility-administered medications on file.        Review of Systems   Constitutional:  Negative for fever, chills, weight loss and malaise/fatigue.   HENT:  Negative for ear pain, nosebleeds, congestion, sore throat and neck pain.    Eyes:  Negative for blurred vision.   Respiratory:  Negative for cough, sputum production, shortness of breath and wheezing.    Cardiovascular:  Negative for chest pain, palpitations, orthopnea and leg swelling.   Gastrointestinal:  Negative for heartburn, nausea, vomiting and abdominal pain.   Genitourinary:  Negative for dysuria, urgency and frequency.   Musculoskeletal:  Negative for myalgias, back pain and joint pain.   Skin:  Negative for rash and itching.   Neurological:  Negative for dizziness, tingling, tremors, sensory change, focal weakness and headaches.   Endo/Heme/Allergies:  Does not bruise/bleed easily.   Psychiatric/Behavioral:  Negative for depression, anxiety, or memory loss.     All other systems reviewed and are negative except as in HPI.    Exam: Blood pressure 112/78, pulse 89, temperature 37 °C (98.6 °F), resp. rate 16, height 1.575 m (5' 2.01\"), weight 66.7 kg (147 lb), last menstrual period 01/13/2018, SpO2 98 %, not currently breastfeeding.  General:  Normal appearing. No distress.  HEENT:  Normocephalic. Eyes conjunctiva clear " lids without ptosis, pupils equal and reactive to light accommodation, ears normal shape and contour, canals are clear bilaterally, tympanic membranes are benign, nasal mucosa benign, oropharynx is without erythema, edema or exudates.   Neck:  Supple without JVD or bruit. Thyroid is not enlarged.  Pulmonary:  Clear to ausculation.  Normal effort. No rales, ronchi, or wheezing.  Cardiovascular:  Regular rate and rhythm without murmur. Carotid and radial pulses are intact and equal bilaterally.  Abdomen:  Soft, nontender, nondistended. Normal bowel sounds. Liver and spleen are not palpable  Neurologic:  Grossly nonfocal  Lymph:  No cervical, supraclavicular or axillary lymph nodes are palpable  Skin:  Warm and dry.  No obvious lesions. Large keloid scar on left upper arm.  Musculoskeletal:  Normal gait. No extremity cyanosis, clubbing, or edema.  Psych:  Normal mood and affect. Alert and oriented x3. Judgment and insight is normal.  : Multiple skin tags noted on the vulva    PLAN:    1. Fibrous histiocytoma of skin  Continue follow-up with Dr. Corrigan    2. Bilateral breast cysts  Continue follow-up with mammogram.    4. Stress incontinence  And tingling exercises.    5. Varicose veins of both lower extremities  Follow-up with vascular.    6. Hematuria, unspecified type  Continue follow-up.  - POCT Urinalysis    Follow-up in 6 months or sooner as needed. Patient is encouraged to be seen in the emergency room for chest pain, palpitations, shortness of breath, dizziness, severe abdominal pain or other concerning symptoms.    Please note that this dictation was created using voice recognition software. I have made every reasonable attempt to correct obvious errors, but I expect that there are errors of grammar and possibly content that I did not discover before finalizing the note.      Assessment/Plan  1. Fibrous histiocytoma of skin     2. Bilateral breast cysts     3. External hemorrhoids without mention of  complication     4. Stress incontinence     5. Varicose veins of both lower extremities     6. Hematuria, unspecified type  POCT Urinalysis         I have placed the below orders and discussed them with an approved delegating provider. The MA is performing the below orders under the direction of Dr. Bermudez.

## 2018-03-13 NOTE — ASSESSMENT & PLAN NOTE
Chronic in nature. Stable. States that she has pain in her legs, states she will have to rub them to make them feel better. States that she will sometimes she will notice swelling in her feet, will follow-up with Dr. Corrigan.

## 2018-06-12 ENCOUNTER — OFFICE VISIT (OUTPATIENT)
Dept: MEDICAL GROUP | Facility: PHYSICIAN GROUP | Age: 48
End: 2018-06-12
Payer: COMMERCIAL

## 2018-06-12 VITALS
HEART RATE: 80 BPM | SYSTOLIC BLOOD PRESSURE: 116 MMHG | DIASTOLIC BLOOD PRESSURE: 80 MMHG | HEIGHT: 62 IN | WEIGHT: 149 LBS | OXYGEN SATURATION: 97 % | TEMPERATURE: 98.7 F | RESPIRATION RATE: 16 BRPM | BODY MASS INDEX: 27.42 KG/M2

## 2018-06-12 DIAGNOSIS — I83.93 VARICOSE VEINS OF BOTH LOWER EXTREMITIES: ICD-10-CM

## 2018-06-12 DIAGNOSIS — L82.1 SEBORRHEIC KERATOSES: ICD-10-CM

## 2018-06-12 DIAGNOSIS — D23.9 FIBROUS HISTIOCYTOMA OF SKIN: ICD-10-CM

## 2018-06-12 DIAGNOSIS — Z12.39 SCREENING FOR BREAST CANCER: ICD-10-CM

## 2018-06-12 PROCEDURE — 17003 DESTRUCT PREMALG LES 2-14: CPT | Performed by: NURSE PRACTITIONER

## 2018-06-12 PROCEDURE — 99396 PREV VISIT EST AGE 40-64: CPT | Mod: 25 | Performed by: NURSE PRACTITIONER

## 2018-06-12 ASSESSMENT — PAIN SCALES - GENERAL: PAINLEVEL: NO PAIN

## 2018-06-12 ASSESSMENT — PATIENT HEALTH QUESTIONNAIRE - PHQ9: CLINICAL INTERPRETATION OF PHQ2 SCORE: 0

## 2018-06-13 NOTE — ASSESSMENT & PLAN NOTE
Chronic in nature. Stable. Patient states that she has not made a follow-up appointment vascular regarding this issue. Patient states that she does not need another referral this time.

## 2018-06-13 NOTE — ASSESSMENT & PLAN NOTE
Chronic in nature. Patient has reports for lesions one in her right under arm and 2 on the side of her right breast as well as one on her left breast. Use these are itchy, frequently become inflamed patient is requesting cryotherapy. States that he is confident anything to make these better or worse.

## 2018-06-13 NOTE — PROGRESS NOTES
Subjective:     CC:   Chief Complaint   Patient presents with   • Follow-Up     3 Months        HPI:   Palak Lopes is a 47 y.o. female who presents for annual exam. She is feeling well and denies any complaints.    Patient has GYN provider: no  Last pap: Up-to-date  Last mammo: Ordered today  Last colonoscopy: None  Last bone density test: None  Last Tdap: Up-to-date  Gardiasil: None  Hx. STD's: Denies  Birth control: Birth control pill    Menses every month with 5 days light bleeding.  Cramping is mild.   She does not take OTC analgesics for cramps.  No significant bloating/fluid retention, pelvic pain, or dyspareunia. No vaginal discharge   No breast tenderness, mass, nipple discharge, changes in size or contour, or abnormal cyclic discomfort.  She does perform regular self breast exams.  Regular exercise: yes   Diet: healthy    She  has a past medical history of Bilateral breast cysts (6/1/2011); Gastritis (11/20/2014); and Urinary incontinence (6-2017). She also has no past medical history of Breast cancer (HCC).  She  has a past surgical history that includes cholecystectomy; primary c section; US-CYST ASPIRATION-BREAST INITIAL; hemorrhoidectomy (6/22/2017); and lesion excision general (Left, 12/18/2017).    Family History   Problem Relation Age of Onset   • Diabetes Neg Hx    • Heart Disease Neg Hx    • Cancer Neg Hx    • Stroke Neg Hx        Social History     Social History   • Marital status:      Spouse name: N/A   • Number of children: N/A   • Years of education: N/A     Occupational History   • Not on file.     Social History Main Topics   • Smoking status: Never Smoker   • Smokeless tobacco: Never Used      Comment: avid all tobacco products   • Alcohol use No   • Drug use: No   • Sexual activity: Not Currently     Birth control/ protection: Pill     Other Topics Concern   • Not on file     Social History Narrative   • No narrative on file       Patient Active Problem List     "Diagnosis Date Noted   • Seborrheic keratoses 06/12/2018   • Fibrous histiocytoma of skin 12/18/2017   • Stress incontinence 09/22/2017   • Varicose veins of both lower extremities 09/22/2017   • Bilateral breast cysts 06/01/2011         Current Outpatient Prescriptions   Medication Sig Dispense Refill   • levonorgestrel-ethinyl estradiol (AVIANE, ALESSE, LESSINA) 0.1-20 MG-MCG per tablet Take 1 Tab by mouth every day. 28 Tab 10   • Melatonin 1 MG SL Tab Place  under tongue.     • Collagen 500 MG Cap Take  by mouth every day.       No current facility-administered medications for this visit.      No Known Allergies    Review of Systems   Constitutional: Negative for fever, chills and malaise/fatigue.   HENT: Negative for congestion.    Eyes: Negative for pain.   Respiratory: Negative for cough and shortness of breath.    Cardiovascular: Negative for leg swelling.   Gastrointestinal: Negative for nausea, vomiting, abdominal pain and diarrhea.   Genitourinary: Negative for dysuria and hematuria.   Skin: Negative for rash.   Neurological: Negative for dizziness, focal weakness and headaches.   Endo/Heme/Allergies: Does not bruise/bleed easily.   Psychiatric/Behavioral: Negative for depression.  The patient is not nervous/anxious.      Objective:     /80   Pulse 80   Temp 37.1 °C (98.7 °F)   Resp 16   Ht 1.575 m (5' 2.01\")   Wt 67.6 kg (149 lb)   SpO2 97%   Breastfeeding? No   BMI 27.25 kg/m²   Body mass index is 27.25 kg/m².  Wt Readings from Last 4 Encounters:   06/12/18 67.6 kg (149 lb)   03/13/18 66.7 kg (147 lb)   02/15/18 66.2 kg (146 lb)   12/18/17 65.6 kg (144 lb 10 oz)       Physical Exam:  Constitutional: Well-developed and well-nourished. Not diaphoretic. No distress.   Skin: Skin is warm and dry. No rash noted.  Head: Atraumatic without lesions.  Eyes: Conjunctivae and extraocular motions are normal. Pupils are equal, round, and reactive to light. No scleral icterus.   Ears:  External ears " unremarkable. Tympanic membranes clear and intact.  Nose: Nares patent. Septum midline. Turbinates without erythema nor edema. No discharge.   Mouth/Throat: Dentition is WNL. Tongue normal. Oropharynx is clear and moist. Posterior pharynx without erythema or exudates.  Neck: Supple, trachea midline. Normal range of motion. No thyromegaly present. No lymphadenopathy--cervical or supraclavicular.  Cardiovascular: Regular rate and rhythm, S1 and S2 without murmur, rubs, or gallops.    Abdomen: Soft, non tender, and without distention. Active bowel sounds in all four quadrants. No rebound, guarding, masses or HSM.  Extremities: No cyanosis, clubbing, erythema, nor edema. Distal pulses intact and symmetric.   Musculoskeletal: All major joints AROM full in all directions without pain.  Neurological: Alert and oriented x 3. DTRs 2+/3 and symmetric. No cranial nerve deficit. 5/5 myotomes. Sensation intact. Negative Rhomberg.  Psychiatric:  Behavior, mood, and affect are appropriate.    Assessment and Plan:     1. Seborrheic keratoses     2. Screening for breast cancer  MA-SCREEN MAMMO W/CAD-BILAT   3. Fibrous histiocytoma of skin     4. Varicose veins of both lower extremities       CRYOTHERAPY:  Discussed risks and benefits of cryotherapy. Patient verbally agreed. Three applications of liquid nitrogen were applied to seborrheic keratosis lesion on right underarm, 2 lesions on right breast including larger 1 cm in diameter seborrheic keratosis, as well as lesion on left breast at 1:00. Patient tolerated procedure well. Aftercare and return precaution instructions given.    HCM:     Labs per orders  Immunizations per orders  Patient counseled about skin care, diet, supplements, prenatal vitamins, safe sex and exercise.    Follow-up: 1 year

## 2018-07-17 DIAGNOSIS — Z30.011 ENCOUNTER FOR INITIAL PRESCRIPTION OF CONTRACEPTIVE PILLS: ICD-10-CM

## 2018-07-17 RX ORDER — LEVONORGESTREL AND ETHINYL ESTRADIOL 0.1-0.02MG
1 KIT ORAL DAILY
Qty: 28 TAB | Refills: 10 | Status: SHIPPED | OUTPATIENT
Start: 2018-07-17 | End: 2019-05-16 | Stop reason: SDUPTHER

## 2018-07-19 ENCOUNTER — HOSPITAL ENCOUNTER (OUTPATIENT)
Dept: RADIOLOGY | Facility: MEDICAL CENTER | Age: 48
End: 2018-07-19

## 2018-07-20 ENCOUNTER — HOSPITAL ENCOUNTER (OUTPATIENT)
Dept: RADIOLOGY | Facility: MEDICAL CENTER | Age: 48
End: 2018-07-20
Attending: NURSE PRACTITIONER
Payer: COMMERCIAL

## 2018-07-20 DIAGNOSIS — Z12.39 SCREENING FOR BREAST CANCER: ICD-10-CM

## 2018-07-20 PROCEDURE — 77067 SCR MAMMO BI INCL CAD: CPT

## 2018-07-24 ENCOUNTER — TELEPHONE (OUTPATIENT)
Dept: MEDICAL GROUP | Facility: PHYSICIAN GROUP | Age: 48
End: 2018-07-24

## 2018-07-24 DIAGNOSIS — N60.02 BILATERAL BREAST CYSTS: ICD-10-CM

## 2018-07-24 DIAGNOSIS — N60.01 BILATERAL BREAST CYSTS: ICD-10-CM

## 2018-07-24 DIAGNOSIS — R92.30 DENSE BREASTS: ICD-10-CM

## 2018-07-24 DIAGNOSIS — R92.2 DENSE BREASTS: ICD-10-CM

## 2018-07-24 NOTE — TELEPHONE ENCOUNTER
----- Message from SANTANA Rossi sent at 7/23/2018  5:35 PM PDT -----  Please call patient: Your mammogram came back no evidence of malignancy. You however have dense breasts which may miss small masses. You have an option of doing ultrasound for dense breasts called sonKLab. This is not covered by your insurance and can cost close to $200. If you want to do this test please let me know so I can order it. Otherwise we will just do the yearly screening mammogram.

## 2018-07-24 NOTE — TELEPHONE ENCOUNTER
Spoke with patient and let them know SANTANA Rossi's message.  Patient would like order for sono cine mailed to her home address.  She is trying to see if Access to Healthcare will cover any of the [place] cost.

## 2018-11-30 ENCOUNTER — OFFICE VISIT (OUTPATIENT)
Dept: MEDICAL GROUP | Facility: PHYSICIAN GROUP | Age: 48
End: 2018-11-30
Payer: COMMERCIAL

## 2018-11-30 VITALS
OXYGEN SATURATION: 98 % | SYSTOLIC BLOOD PRESSURE: 102 MMHG | WEIGHT: 151 LBS | DIASTOLIC BLOOD PRESSURE: 78 MMHG | BODY MASS INDEX: 27.79 KG/M2 | RESPIRATION RATE: 14 BRPM | TEMPERATURE: 97.9 F | HEART RATE: 78 BPM | HEIGHT: 62 IN

## 2018-11-30 DIAGNOSIS — L91.0 KELOID SCAR: ICD-10-CM

## 2018-11-30 PROCEDURE — 99213 OFFICE O/P EST LOW 20 MIN: CPT | Performed by: PHYSICIAN ASSISTANT

## 2018-12-01 NOTE — PROGRESS NOTES
Chief Complaint   Patient presents with   • Arm Pain     LT arm x 3 days intermitten, no px today       HISTORY OF PRESENT ILLNESS: Palak Lopes is an established 48 y.o. female here to discuss the evaluation and management of:    Patient is a pleasant 48-year-old female here to discuss intermittent right upper arm pain.  Patient has a keloid scar 5.5 cm in length on right upper extremity.  She tells me several years ago she had a vaccination and developed scar tissue from vaccination.  States right upper arm was painful for several years.  States she followed up with a general surgeon and an excisional biopsy was completed.  The tissue indicated unclear pathology so patient underwent excision of the lesion on 12/18/2018 without complication. Surgery was performed by Dr. Wesley Corrigan  She is concerned about cancer.  She tells me that she was told tissue was precancerous.  Reassured patient during today's appointment that further workup would have been completed if tissue was cancerous.  States she is experiencing intermittent episodes of pain and occasional warmness around area.  Reassured patient during today's appointment that scar tissue will need time to break down.  Denies erythema/discharge.  Denies suspicious lesions.  Admits to occasional pruritus.        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    Current Outpatient Prescriptions   Medication Sig Dispense Refill   • levonorgestrel-ethinyl estradiol (AVIANE, ALESSE, LESSINA) 0.1-20 MG-MCG per tablet Take 1 Tab by mouth every day. 28 Tab 10   • Melatonin 1 MG SL Tab Place  under tongue.     • Collagen 500 MG Cap Take  by mouth every day.       No  "current facility-administered medications for this visit.        Social History   Substance Use Topics   • Smoking status: Never Smoker   • Smokeless tobacco: Never Used      Comment: aviod all tobacco products   • Alcohol use No       Family Status   Relation Status   • Son Alive   • Neg Hx (Not Specified)     Family History   Problem Relation Age of Onset   • Diabetes Neg Hx    • Heart Disease Neg Hx    • Cancer Neg Hx    • Stroke Neg Hx        ROS:  Review of Systems   Constitutional: Negative for fever, chills, weight loss and malaise/fatigue.   HENT: Negative for ear pain, nosebleeds, congestion, sore throat and neck pain.    Eyes: Negative for blurred vision.   Respiratory: Negative for cough, sputum production, shortness of breath and wheezing.    Cardiovascular: Negative for chest pain, palpitations, orthopnea and leg swelling.   Gastrointestinal: Negative for heartburn, nausea, vomiting and abdominal pain.   Genitourinary: Negative for dysuria, urgency and frequency.   Musculoskeletal: Negative for myalgias, back pain and joint pain.   Skin: Negative for rash and itching.  Positive for right upper extremity scar intermittent pain.  Neurological: Negative for dizziness, tingling, tremors, sensory change, focal weakness and headaches.   Endo/Heme/Allergies: Does not bruise/bleed easily.   Psychiatric/Behavioral: Negative for depression, suicidal ideas and memory loss.  The patient is not nervous/anxious and does not have insomnia.    All other systems reviewed and are negative except as in HPI.    Exam: Blood pressure 102/78, pulse 78, temperature 36.6 °C (97.9 °F), resp. rate 14, height 1.575 m (5' 2.01\"), weight 68.5 kg (151 lb), SpO2 98 %, not currently breastfeeding. Body mass index is 27.61 kg/m².  General: Normal appearing. No distress.  HEENT: Normocephalic. Eyes conjunctiva clear lids without ptosis, ears normal shape and contour.   Neck: Supple without JVD or bruit. Thyroid is not " enlarged.  Pulmonary: Clear to ausculation.  Normal effort. No rales, ronchi, or wheezing.  Cardiovascular: Regular rate and rhythm without murmur.   Abdomen: Soft, nontender, nondistended.   Neurologic: Grossly nonfocal.  Cranial nerves are normal.   Lymph: No cervical, supraclavicular or axillary lymph nodes are palpable  Skin: Warm and dry.  No rashes or suspicious skin lesions.  Positive for 5.5 cm in length keloid scar on right upper extremity.  Width varies.  No signs of cellulitis.  Keloid is nontender to palpation.  Negative for erythema/warmth/discharge.  Negative for excoriation.  Musculoskeletal: Normal gait. No extremity cyanosis, clubbing, or edema.  Psych: Normal mood and affect. Alert and oriented x3. Judgment and insight is normal.    Medical decision-making and discussion:  1. Keloid scar  Advised patient will take time for keloid scar to breakdown.  Suggested massaging.  Reassured patient during today's appointment. Advised patient to follow-up with her PCP if symptoms worsen.    Please note that this dictation was created using voice recognition software. I have made every reasonable attempt to correct obvious errors, but I expect that there are errors of grammar and possibly content that I did not discover before finalizing the note.        Return if symptoms worsen or fail to improve.

## 2018-12-14 ENCOUNTER — TELEPHONE (OUTPATIENT)
Dept: MEDICAL GROUP | Facility: PHYSICIAN GROUP | Age: 48
End: 2018-12-14

## 2018-12-14 NOTE — TELEPHONE ENCOUNTER
Pt was concerned that she missed two days of birth control. She took the Thursday and Friday pill today.    She was informed to obtain from sex or use condoms for the next 7 days.    Per Dr. BLANCO    Pt states understanding

## 2019-05-16 DIAGNOSIS — Z30.011 ENCOUNTER FOR INITIAL PRESCRIPTION OF CONTRACEPTIVE PILLS: ICD-10-CM

## 2019-05-16 RX ORDER — LEVONORGESTREL AND ETHINYL ESTRADIOL 0.1-0.02MG
KIT ORAL
Qty: 84 TAB | Refills: 0 | Status: SHIPPED | OUTPATIENT
Start: 2019-05-16 | End: 2019-09-04 | Stop reason: SDUPTHER

## 2019-09-04 DIAGNOSIS — Z30.011 ENCOUNTER FOR INITIAL PRESCRIPTION OF CONTRACEPTIVE PILLS: ICD-10-CM

## 2019-09-04 RX ORDER — LEVONORGESTREL AND ETHINYL ESTRADIOL 0.1-0.02MG
KIT ORAL
Qty: 28 TAB | Refills: 0 | Status: SHIPPED | OUTPATIENT
Start: 2019-09-04 | End: 2019-09-18 | Stop reason: SDUPTHER

## 2019-09-18 ENCOUNTER — OFFICE VISIT (OUTPATIENT)
Dept: MEDICAL GROUP | Facility: PHYSICIAN GROUP | Age: 49
End: 2019-09-18
Payer: COMMERCIAL

## 2019-09-18 VITALS
RESPIRATION RATE: 16 BRPM | HEART RATE: 68 BPM | SYSTOLIC BLOOD PRESSURE: 112 MMHG | HEIGHT: 62 IN | BODY MASS INDEX: 25.73 KG/M2 | WEIGHT: 139.8 LBS | TEMPERATURE: 97.1 F | OXYGEN SATURATION: 100 % | DIASTOLIC BLOOD PRESSURE: 74 MMHG

## 2019-09-18 DIAGNOSIS — I83.93 SPIDER VEINS OF BOTH LOWER EXTREMITIES: ICD-10-CM

## 2019-09-18 DIAGNOSIS — Z30.41 ENCOUNTER FOR SURVEILLANCE OF CONTRACEPTIVE PILLS: ICD-10-CM

## 2019-09-18 DIAGNOSIS — Z12.39 SCREENING FOR BREAST CANCER: ICD-10-CM

## 2019-09-18 DIAGNOSIS — Z12.11 SCREENING FOR COLON CANCER: ICD-10-CM

## 2019-09-18 PROCEDURE — 99396 PREV VISIT EST AGE 40-64: CPT | Performed by: NURSE PRACTITIONER

## 2019-09-18 RX ORDER — LEVONORGESTREL AND ETHINYL ESTRADIOL 0.1-0.02MG
1 KIT ORAL DAILY
Qty: 84 TAB | Refills: 3 | Status: SHIPPED | OUTPATIENT
Start: 2019-09-18 | End: 2019-09-23 | Stop reason: SDUPTHER

## 2019-09-18 ASSESSMENT — PATIENT HEALTH QUESTIONNAIRE - PHQ9: CLINICAL INTERPRETATION OF PHQ2 SCORE: 0

## 2019-09-18 NOTE — PROGRESS NOTES
Subjective:     CC:   Chief Complaint   Patient presents with   • Annual Exam     mammogram       HPI:   Palak Lopes is a 49 y.o. female who presents for annual exam.     Patient is feeling well overall and denies any acute complaints. She would like refill of her birth control pills. She denies any abnormal periods while on the birth control pills but notes that she just notices a tinge of pink when she urinates. Denies any menopausal symptoms including no sweats. She notes that she previously had referral to see a vascular specialist regarding spider veins but has not received a call yet. She is wondering if she needs to get a new referral to vascular.    Patient has GYN provider: no  Last pap: 9/22/2017  Last mammo: 7/20/2018. Due.  Last colonoscopy: Due  Last bone density test: N/A  Last Tdap: 1/2/2015  Gardiasil: N/A  Hx. STD's: Denies  Birth control: Birth control pill    Menses every month with 5 days light bleeding.  Cramping is mild.   She does not take OTC analgesics for cramps.  No significant bloating/fluid retention, pelvic pain, or dyspareunia. No vaginal discharge   No breast tenderness, mass, nipple discharge, changes in size or contour, or abnormal cyclic discomfort.  She does perform regular self breast exams.  Regular exercise: yes   Diet: healthy    She  has a past medical history of Bilateral breast cysts (6/1/2011), Gastritis (11/20/2014), and Urinary incontinence (6-2017). She also has no past medical history of Breast cancer (HCC).  She  has a past surgical history that includes cholecystectomy; primary c section; US-CYST ASPIRATION-BREAST INITIAL; hemorrhoidectomy (6/22/2017); and lesion excision general (Left, 12/18/2017).    Family History   Problem Relation Age of Onset   • Diabetes Neg Hx    • Heart Disease Neg Hx    • Cancer Neg Hx    • Stroke Neg Hx        Social History     Socioeconomic History   • Marital status:      Spouse name: Not on file   • Number of  children: Not on file   • Years of education: Not on file   • Highest education level: Not on file   Occupational History   • Not on file   Social Needs   • Financial resource strain: Not on file   • Food insecurity:     Worry: Not on file     Inability: Not on file   • Transportation needs:     Medical: Not on file     Non-medical: Not on file   Tobacco Use   • Smoking status: Never Smoker   • Smokeless tobacco: Never Used   • Tobacco comment: aviod all tobacco products   Substance and Sexual Activity   • Alcohol use: No     Alcohol/week: 0.0 oz   • Drug use: No   • Sexual activity: Yes     Partners: Male     Birth control/protection: Pill   Lifestyle   • Physical activity:     Days per week: Not on file     Minutes per session: Not on file   • Stress: Not on file   Relationships   • Social connections:     Talks on phone: Not on file     Gets together: Not on file     Attends [employer] service: Not on file     Active member of club or organization: Not on file     Attends meetings of clubs or organizations: Not on file     Relationship status: Not on file   • Intimate partner violence:     Fear of current or ex partner: Not on file     Emotionally abused: Not on file     Physically abused: Not on file     Forced sexual activity: Not on file   Other Topics Concern   • Not on file   Social History Narrative   • Not on file       Patient Active Problem List    Diagnosis Date Noted   • Keloid scar 11/30/2018   • Seborrheic keratoses 06/12/2018   • 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 84 Tab 3   • Melatonin 1 MG SL Tab Place  under tongue.     • Collagen 500 MG Cap Take  by mouth every day.       No current  "facility-administered medications for this visit.      No Known Allergies    Review of Systems  Constitutional: Negative for fever, chills and malaise/fatigue.   HENT: Negative for congestion.    Eyes: Negative for pain.   Respiratory: Negative for cough and shortness of breath.    Cardiovascular: Negative for leg swelling.   Gastrointestinal: Negative for nausea, vomiting, abdominal pain and diarrhea.   Genitourinary: Negative for dysuria and hematuria.   Skin: Spider veins to bilateral legs. Negative for rash.   Neurological: Negative for dizziness, focal weakness and headaches.   Endo/Heme/Allergies: Does not bruise/bleed easily.   Psychiatric/Behavioral: Negative for depression.  The patient is not nervous/anxious.      Objective:     /74 (BP Location: Left arm, Patient Position: Sitting)   Pulse 68   Temp 36.2 °C (97.1 °F) (Temporal)   Resp 16   Ht 1.575 m (5' 2.01\")   Wt 63.4 kg (139 lb 12.8 oz)   SpO2 100%   BMI 25.56 kg/m²   Body mass index is 25.56 kg/m².  Wt Readings from Last 4 Encounters:   09/18/19 63.4 kg (139 lb 12.8 oz)   11/30/18 68.5 kg (151 lb)   06/12/18 67.6 kg (149 lb)   03/13/18 66.7 kg (147 lb)       Physical Exam:  Constitutional: Well-developed and well-nourished. Not diaphoretic. No distress.   Skin: Skin is warm and dry. No rash noted. Small purple vessels visible on bilateral lower legs consistent with spider veins.  Head: Atraumatic without lesions.  Eyes: Conjunctivae and extraocular motions are normal. Pupils are equal, round, and reactive to light. No scleral icterus.   Ears:  External ears unremarkable. Tympanic membranes clear and intact.  Nose: Nares patent. Septum midline. Turbinates without erythema nor edema. No discharge.   Mouth/Throat: Dentition is normal. Tongue normal. Oropharynx is clear and moist. Posterior pharynx without erythema or exudates.  Neck: Supple, trachea midline. Normal range of motion. No thyromegaly present. No lymphadenopathy--cervical or " supraclavicular.  Cardiovascular: Regular rate and rhythm, S1 and S2 without murmur, rubs, or gallops.    Abdomen: Soft, non tender, and without distention. Active bowel sounds in all four quadrants. No rebound, guarding, masses or HSM.  Extremities: No cyanosis, clubbing, erythema, nor edema. Distal pulses intact and symmetric.   Musculoskeletal: All major joints AROM full in all directions without pain.  Neurological: Alert and oriented x 3. DTRs 2+/3 and symmetric. No cranial nerve deficit. Sensation intact. Negative Rhomberg.  Psychiatric:  Behavior, mood, and affect are appropriate.      Assessment and Plan:     1. Screening for breast cancer  - Due for mammogram  - MA-SCREENING MAMMO BILAT W/TOMOSYNTHESIS W/CAD; Future    2. Screening for colon cancer  - Due for colonoscopy  - REFERRAL TO GI FOR COLONOSCOPY    3. Encounter for initial prescription of contraceptive pills  - Refilled Aviane, as below.  - levonorgestrel-ethinyl estradiol (AVIANE) 0.1-20 MG-MCG per tablet; Take 1 Tab by mouth every day.  Dispense: 84 Tab; Refill: 3     4. Spider veins of both lower extremities  - Patient states she never received a call regarding a prior vascular surgery referral given to her a couple of years ago for her spider veins. Provided new referral.  - REFERRAL TO VASCULAR SURGERY     Patient states she received a chicken pox vaccine a couple of months ago. She will provide us with documentation on this so that we can see specifically which vaccine she received.      Plans  Labs per orders  Immunizations per orders  Patient counseled about skin care, diet, supplements, prenatal vitamins, safe sex and exercise.    Patient is encouraged to be seen in the emergency room for chest pain, palpitations, shortness of breath, dizziness, severe abdominal pain or other concerning symptoms.     Follow-up: Return in about 1 year (around 9/18/2020), or if symptoms worsen or fail to improve.     Florentin SAHA (Aleyda), am  scribing for, and in the presence of, KENYETTA Manzano    Electronically signed by: Florentin Carter (Scribe), 9/18/2019    I, KENYETTA Manzano personally performed the services described in this documentation, as scribed by Florentin Carter in my presence, and it is both accurate and complete.

## 2019-09-23 DIAGNOSIS — Z30.41 ENCOUNTER FOR SURVEILLANCE OF CONTRACEPTIVE PILLS: ICD-10-CM

## 2019-09-23 RX ORDER — LEVONORGESTREL AND ETHINYL ESTRADIOL 0.1-0.02MG
1 KIT ORAL DAILY
Qty: 84 TAB | Refills: 3 | Status: SHIPPED | OUTPATIENT
Start: 2019-09-23 | End: 2020-07-27

## 2019-09-23 NOTE — TELEPHONE ENCOUNTER
Was the patient seen in the last year in this department? Yes    Does patient have an active prescription for medications requested? No     Received Request Via: Patient       Patient wants to fill this today. Anuj

## 2019-10-03 ENCOUNTER — TELEPHONE (OUTPATIENT)
Dept: MEDICAL GROUP | Facility: PHYSICIAN GROUP | Age: 49
End: 2019-10-03

## 2019-10-03 NOTE — TELEPHONE ENCOUNTER
1. Caller Name: Palak GomezRadha                      Call Back Number: [phone] (home)     2. Message: Pt called this morning requesting that her mammogram order be [facility]ed [facility]. Order faxed, see fax confirmation in media.    3. Patient approves office to leave a detailed voicemail/MyChart message: N\A

## 2019-12-03 ENCOUNTER — HOSPITAL ENCOUNTER (OUTPATIENT)
Dept: RADIOLOGY | Facility: MEDICAL CENTER | Age: 49
End: 2019-12-03
Attending: NURSE PRACTITIONER
Payer: OTHER GOVERNMENT

## 2019-12-03 DIAGNOSIS — Z12.39 SCREENING FOR BREAST CANCER: ICD-10-CM

## 2019-12-03 PROCEDURE — 77063 BREAST TOMOSYNTHESIS BI: CPT

## 2021-05-12 ENCOUNTER — TELEPHONE (OUTPATIENT)
Dept: MEDICAL GROUP | Facility: PHYSICIAN GROUP | Age: 51
End: 2021-05-12

## 2021-05-12 DIAGNOSIS — Z12.31 ENCOUNTER FOR SCREENING MAMMOGRAM FOR MALIGNANT NEOPLASM OF BREAST: ICD-10-CM

## 2021-05-12 NOTE — TELEPHONE ENCOUNTER
Phone Number Called: [phone] (home)       Call outcome: Left detailed message for patient. Informed to call back with any additional questions.    Message: [employer] used #[id number]. Pt informed mammogram has been ordered and to call [phone] to schedule. Pt also informed to follow up with PCP on scheduled appt.

## 2021-06-15 ENCOUNTER — OFFICE VISIT (OUTPATIENT)
Dept: MEDICAL GROUP | Facility: PHYSICIAN GROUP | Age: 51
End: 2021-06-15
Payer: COMMERCIAL

## 2021-06-15 VITALS
HEIGHT: 65 IN | BODY MASS INDEX: 26.96 KG/M2 | SYSTOLIC BLOOD PRESSURE: 100 MMHG | HEART RATE: 88 BPM | WEIGHT: 161.8 LBS | OXYGEN SATURATION: 96 % | DIASTOLIC BLOOD PRESSURE: 66 MMHG | TEMPERATURE: 97.9 F | RESPIRATION RATE: 12 BRPM

## 2021-06-15 DIAGNOSIS — Z12.31 ENCOUNTER FOR SCREENING MAMMOGRAM FOR MALIGNANT NEOPLASM OF BREAST: ICD-10-CM

## 2021-06-15 DIAGNOSIS — M25.40 JOINT SWELLING: ICD-10-CM

## 2021-06-15 DIAGNOSIS — Z78.0 MENOPAUSE: ICD-10-CM

## 2021-06-15 DIAGNOSIS — M25.50 ARTHRALGIA, UNSPECIFIED JOINT: ICD-10-CM

## 2021-06-15 DIAGNOSIS — Z00.00 WELLNESS EXAMINATION: ICD-10-CM

## 2021-06-15 DIAGNOSIS — Z12.11 SCREENING FOR COLON CANCER: ICD-10-CM

## 2021-06-15 PROCEDURE — 99213 OFFICE O/P EST LOW 20 MIN: CPT | Performed by: NURSE PRACTITIONER

## 2021-06-15 RX ORDER — COLLAGEN, HYDROLYSATE (BOVINE) 100 %
POWDER (GRAM) MISCELLANEOUS
COMMUNITY
End: 2022-08-29

## 2021-06-15 ASSESSMENT — PATIENT HEALTH QUESTIONNAIRE - PHQ9: CLINICAL INTERPRETATION OF PHQ2 SCORE: 0

## 2021-06-15 NOTE — ASSESSMENT & PLAN NOTE
This is a new issue.  Patient states that she stopped birth control pills proximately 1 year ago.  States that she has not had a menstrual cycle since stopping the birth control pills.  Patient denies any hot flashes states that she does have some intermittent episodes of insomnia but states that this was a problem throughout her entire life.  Patient states that she has had some recent increase in fatigue, dry skin.  Patient denies any weight gain, heart palpitations, constipation or diarrhea.

## 2021-06-15 NOTE — PROGRESS NOTES
Chief Complaint   Patient presents with   • Referral Needed     Mammogram   • Menstrual Problem     Menopausal        HISTORY OF PRESENT ILLNESS: Patient is a 50 y.o. female established patient who presents today to discuss possible menopause, joint pain.    Menopause  This is a new issue.  Patient states that she stopped birth control pills proximately 1 year ago.  States that she has not had a menstrual cycle since stopping the birth control pills.  Patient denies any hot flashes states that she does have some intermittent episodes of insomnia but states that this was a problem throughout her entire life.  Patient states that she has had some recent increase in fatigue, dry skin.  Patient denies any weight gain, heart palpitations, constipation or diarrhea.    Joint pain  This is a chronic issue.  Patient states that she has had joint pain and inflammation over the last 6 months.  Patient states that she would not say she has noticed redness of the joints but states that she has felt like they were a little puffy.  Patient states that this has been multiple joints shoulders, knees, hands states that she will notice mostly stiffness in the morning but states that stretching will help it feel better.  Patient states that pain is overall a constant aching.  She states that she does have a family history of mom diagnosed with rheumatoid arthritis at age 40.  Patient does also noticed some increased pain and stiffness in the evening after walking and working all day.  Patient does have a single Heberden's nodule noted on her second finger MCP of her right hand.  Patient does not report pain more significant in that joint versus other joints.  Patient denies any numbness or tingling in her hands or wrist.       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    Current Outpatient Medications   Medication Sig Dispense Refill   • Collagen Hydrolysate Powder 3 times a day before each meal.       No current facility-administered medications for this visit.       Social History     Tobacco Use   • Smoking status: Never Smoker   • Smokeless tobacco: Never Used   • Tobacco comment: aviod all tobacco products   Vaping Use   • Vaping Use: Never used   Substance Use Topics   • Alcohol use: No     Alcohol/week: 0.0 oz   • Drug use: No       Family Status   Relation Name Status   • Son  Alive   • Neg Hx  (Not Specified)     Family History   Problem Relation Age of Onset   • Diabetes Neg Hx    • Heart Disease Neg Hx    • Cancer Neg Hx    • Stroke Neg Hx        ROS:   Patient denies headache, blurry vision, dizziness, chest pain, shortness of breath, abdominal pain, nausea, vomiting, change in level of consciousness.  All other systems reviewed and are negative except as in HPI.    Exam:  /66 (BP Location: Left arm, Patient Position: Sitting, BP Cuff Size: Adult)   Pulse 88   Temp 36.6 °C (97.9 °F) (Temporal)   Resp 12   Ht 1.651 m (5' 5\")   Wt 73.4 kg (161 lb 12.8 oz)   SpO2 96%   General:  Normal appearing. No distress.  Pulmonary:  Clear to ausculation.  Normal effort. No rales, ronchi, or wheezing.  Cardiovascular:  Regular rate and rhythm without murmur. Carotid and radial pulses are intact and equal bilaterally.  Neurologic:  Grossly nonfocal  Lymph:  No cervical, supraclavicular lymph nodes are palpable  Skin:  Warm and dry.  No obvious lesions.  Musculoskeletal:  Normal gait. No extremity cyanosis, clubbing, or edema.  1 noted firm nodule on the MCP of the second finger of the right hand it is consistent with a Heberden's nodule, some noted bogginess to joints on assessment full range of " motion is noted in bilateral shoulders, elbows, knees.   Psych:  Normal mood and affect. Alert and oriented x3. Judgment and insight is normal.      PLAN:    1. Encounter for screening mammogram for malignant neoplasm of breast  - MA-SCREENING MAMMO BILAT W/TOMOSYNTHESIS W/CAD; Future    2. Menopause  Patient requesting labs. She is noticing some fatigue.  - PROGESTERONE; Future  - ESTRADIOL; Future  - FSH/LH; Future    3. Arthralgia, unspecified joint  4. Joint swelling  Patient with family history of RA in mom dx at 40.   Tenderness and swelling of joints over the last 6 months.   - CRP QUANTITIVE (NON-CARDIAC); Future  - Sed Rate; Future  - RHEUMATOID ARTHRITIS FACTOR; Future  - CCP ANTIBODY; Future    5. Screening for colon cancer  - COLOGUARD (FIT DNA)    6. Wellness examination  - CBC WITH DIFFERENTIAL; Future  - Comp Metabolic Panel; Future  - Lipid Profile; Future      Return in about 1 month (around 7/15/2021) for Annual Well-Woman.    Please note that this dictation was created using voice recognition software. I have made every reasonable attempt to correct obvious errors, but I expect that there are errors of grammar and possibly content that I did not discover before finalizing the note.

## 2021-06-15 NOTE — ASSESSMENT & PLAN NOTE
This is a chronic issue.  Patient states that she has had joint pain and inflammation over the last 6 months.  Patient states that she would not say she has noticed redness of the joints but states that she has felt like they were a little puffy.  Patient states that this has been multiple joints shoulders, knees, hands states that she will notice mostly stiffness in the morning but states that stretching will help it feel better.  Patient states that pain is overall a constant aching.  She states that she does have a family history of mom diagnosed with rheumatoid arthritis at age 40.  Patient does also noticed some increased pain and stiffness in the evening after walking and working all day.  Patient does have a single Heberden's nodule noted on her second finger MCP of her right hand.  Patient does not report pain more significant in that joint versus other joints.  Patient denies any numbness or tingling in her hands or wrist.

## 2021-06-18 ENCOUNTER — HOSPITAL ENCOUNTER (OUTPATIENT)
Dept: LAB | Facility: MEDICAL CENTER | Age: 51
End: 2021-06-18
Attending: NURSE PRACTITIONER
Payer: COMMERCIAL

## 2021-06-18 DIAGNOSIS — Z00.00 WELLNESS EXAMINATION: ICD-10-CM

## 2021-06-18 DIAGNOSIS — Z78.0 MENOPAUSE: ICD-10-CM

## 2021-06-18 DIAGNOSIS — M25.40 JOINT SWELLING: ICD-10-CM

## 2021-06-18 DIAGNOSIS — M25.50 ARTHRALGIA, UNSPECIFIED JOINT: ICD-10-CM

## 2021-06-18 LAB
ALBUMIN SERPL BCP-MCNC: 4.4 G/DL (ref 3.2–4.9)
ALBUMIN/GLOB SERPL: 1.3 G/DL
ALP SERPL-CCNC: 78 U/L (ref 30–99)
ALT SERPL-CCNC: 31 U/L (ref 2–50)
ANION GAP SERPL CALC-SCNC: 10 MMOL/L (ref 7–16)
AST SERPL-CCNC: 22 U/L (ref 12–45)
BASOPHILS # BLD AUTO: 0.5 % (ref 0–1.8)
BASOPHILS # BLD: 0.03 K/UL (ref 0–0.12)
BILIRUB SERPL-MCNC: 0.8 MG/DL (ref 0.1–1.5)
BUN SERPL-MCNC: 12 MG/DL (ref 8–22)
CALCIUM SERPL-MCNC: 9.4 MG/DL (ref 8.5–10.5)
CHLORIDE SERPL-SCNC: 102 MMOL/L (ref 96–112)
CHOLEST SERPL-MCNC: 191 MG/DL (ref 100–199)
CO2 SERPL-SCNC: 28 MMOL/L (ref 20–33)
CREAT SERPL-MCNC: 0.52 MG/DL (ref 0.5–1.4)
CRP SERPL HS-MCNC: 0.37 MG/DL (ref 0–0.75)
EOSINOPHIL # BLD AUTO: 0.11 K/UL (ref 0–0.51)
EOSINOPHIL NFR BLD: 1.9 % (ref 0–6.9)
ERYTHROCYTE [DISTWIDTH] IN BLOOD BY AUTOMATED COUNT: 42.8 FL (ref 35.9–50)
ERYTHROCYTE [SEDIMENTATION RATE] IN BLOOD BY WESTERGREN METHOD: 13 MM/HOUR (ref 0–25)
FASTING STATUS PATIENT QL REPORTED: NORMAL
FSH SERPL-ACNC: 60.2 MIU/ML
GLOBULIN SER CALC-MCNC: 3.4 G/DL (ref 1.9–3.5)
GLUCOSE SERPL-MCNC: 95 MG/DL (ref 65–99)
HCT VFR BLD AUTO: 43.1 % (ref 37–47)
HDLC SERPL-MCNC: 45 MG/DL
HGB BLD-MCNC: 14.5 G/DL (ref 12–16)
IMM GRANULOCYTES # BLD AUTO: 0.02 K/UL (ref 0–0.11)
IMM GRANULOCYTES NFR BLD AUTO: 0.4 % (ref 0–0.9)
LDLC SERPL CALC-MCNC: 115 MG/DL
LH SERPL-ACNC: 41.5 IU/L
LYMPHOCYTES # BLD AUTO: 1.93 K/UL (ref 1–4.8)
LYMPHOCYTES NFR BLD: 33.8 % (ref 22–41)
MCH RBC QN AUTO: 30.5 PG (ref 27–33)
MCHC RBC AUTO-ENTMCNC: 33.6 G/DL (ref 33.6–35)
MCV RBC AUTO: 90.7 FL (ref 81.4–97.8)
MONOCYTES # BLD AUTO: 0.45 K/UL (ref 0–0.85)
MONOCYTES NFR BLD AUTO: 7.9 % (ref 0–13.4)
NEUTROPHILS # BLD AUTO: 3.17 K/UL (ref 2–7.15)
NEUTROPHILS NFR BLD: 55.5 % (ref 44–72)
NRBC # BLD AUTO: 0 K/UL
NRBC BLD-RTO: 0 /100 WBC
PLATELET # BLD AUTO: 264 K/UL (ref 164–446)
PMV BLD AUTO: 10.6 FL (ref 9–12.9)
POTASSIUM SERPL-SCNC: 4.1 MMOL/L (ref 3.6–5.5)
PROGEST SERPL-MCNC: 0.25 NG/ML
PROT SERPL-MCNC: 7.8 G/DL (ref 6–8.2)
RBC # BLD AUTO: 4.75 M/UL (ref 4.2–5.4)
RHEUMATOID FACT SER IA-ACNC: <10 IU/ML (ref 0–14)
SODIUM SERPL-SCNC: 140 MMOL/L (ref 135–145)
TRIGL SERPL-MCNC: 154 MG/DL (ref 0–149)
WBC # BLD AUTO: 5.7 K/UL (ref 4.8–10.8)

## 2021-06-18 PROCEDURE — 86200 CCP ANTIBODY: CPT

## 2021-06-18 PROCEDURE — 84144 ASSAY OF PROGESTERONE: CPT

## 2021-06-18 PROCEDURE — 83002 ASSAY OF GONADOTROPIN (LH): CPT

## 2021-06-18 PROCEDURE — 83001 ASSAY OF GONADOTROPIN (FSH): CPT

## 2021-06-18 PROCEDURE — 80053 COMPREHEN METABOLIC PANEL: CPT

## 2021-06-18 PROCEDURE — 82670 ASSAY OF TOTAL ESTRADIOL: CPT

## 2021-06-18 PROCEDURE — 86431 RHEUMATOID FACTOR QUANT: CPT

## 2021-06-18 PROCEDURE — 80061 LIPID PANEL: CPT

## 2021-06-18 PROCEDURE — 85025 COMPLETE CBC W/AUTO DIFF WBC: CPT

## 2021-06-18 PROCEDURE — 86140 C-REACTIVE PROTEIN: CPT

## 2021-06-18 PROCEDURE — 85652 RBC SED RATE AUTOMATED: CPT

## 2021-06-18 PROCEDURE — 36415 COLL VENOUS BLD VENIPUNCTURE: CPT

## 2021-06-20 LAB
CCP IGG SERPL-ACNC: 3 UNITS (ref 0–19)
ESTRADIOL SERPL-MCNC: <20 PG/ML

## 2021-06-21 ENCOUNTER — TELEPHONE (OUTPATIENT)
Dept: MEDICAL GROUP | Facility: PHYSICIAN GROUP | Age: 51
End: 2021-06-21

## 2021-06-21 NOTE — TELEPHONE ENCOUNTER
----- Message from Tanisha Ramirez M.D. sent at 6/21/2021 11:28 AM PDT -----  Your recent lab results are normal except for:  -Elevated triglycerides and LDL cholesterol.  This has increased compared to 4 years ago.

## 2021-06-21 NOTE — TELEPHONE ENCOUNTER
Phone Number Called: [phone] (home)     Call outcome: Spoke to patient regarding message below.    Message: Spoke with patient and let them know Tanisha Ramirez MD.'s message.

## 2021-07-01 ENCOUNTER — APPOINTMENT (OUTPATIENT)
Dept: RADIOLOGY | Facility: MEDICAL CENTER | Age: 51
End: 2021-07-01
Attending: NURSE PRACTITIONER
Payer: OTHER GOVERNMENT

## 2021-07-19 DIAGNOSIS — Z30.41 ENCOUNTER FOR SURVEILLANCE OF CONTRACEPTIVE PILLS: ICD-10-CM

## 2021-07-19 RX ORDER — LEVONORGESTREL AND ETHINYL ESTRADIOL 0.1-0.02MG
KIT ORAL
Qty: 84 TABLET | Refills: 3 | Status: SHIPPED | OUTPATIENT
Start: 2021-07-19 | End: 2021-08-17

## 2021-07-19 NOTE — TELEPHONE ENCOUNTER
Received request via: Patient    Was the patient seen in the last year in this department? Yes    Does the patient have an active prescription (recently filled or refills available) for medication(s) requested? No     Pt called to request a refill on this medication

## 2021-07-24 ENCOUNTER — HOSPITAL ENCOUNTER (OUTPATIENT)
Dept: RADIOLOGY | Facility: MEDICAL CENTER | Age: 51
End: 2021-07-24
Attending: NURSE PRACTITIONER
Payer: OTHER GOVERNMENT

## 2021-07-24 DIAGNOSIS — Z12.31 ENCOUNTER FOR SCREENING MAMMOGRAM FOR MALIGNANT NEOPLASM OF BREAST: ICD-10-CM

## 2021-07-24 PROCEDURE — 77063 BREAST TOMOSYNTHESIS BI: CPT

## 2021-07-26 ENCOUNTER — TELEPHONE (OUTPATIENT)
Dept: MEDICAL GROUP | Facility: PHYSICIAN GROUP | Age: 51
End: 2021-07-26

## 2021-07-26 NOTE — TELEPHONE ENCOUNTER
Phone Number Called: [phone] (home)     Call outcome: Spoke to patient regarding message below.    Message: Pt informed of message below.  Pt had questions about lack of menses after discontinuing BC for one year, informed the Pt to ask PCP when she is in appt on 08/17/21.  Also Pt asked for a review on her labs, and per resulted labs informed Pt yes her Cholesterol levels had increased, and PCP would go over that in further detail at follow up appointment.  No further questions at this time.

## 2021-07-26 NOTE — TELEPHONE ENCOUNTER
Phone Number Called: [phone]    Call outcome: Did not leave a detailed message. Requested patient to call back.    Message: Left message to patient to please give a call back.

## 2021-07-26 NOTE — TELEPHONE ENCOUNTER
VOICEMAIL  1. Caller Name: Palak Brunner                      Call Back Number: [phone] (home)     2. Message: Pt returned call.  Asked for a cb.

## 2021-07-26 NOTE — TELEPHONE ENCOUNTER
----- Message from Lee Clayton P.A.-C. sent at 7/26/2021  2:03 PM PDT -----  Please contact the patient by telephone and provide the following results and instructions:    Please inform Ms. Brunner that her mammogram revealed no concerns.    Follow-up with another mammogram in 1 year.    Lee Clayton PA-C

## 2021-07-28 ENCOUNTER — TELEPHONE (OUTPATIENT)
Dept: MEDICAL GROUP | Facility: PHYSICIAN GROUP | Age: 51
End: 2021-07-28

## 2021-07-28 NOTE — TELEPHONE ENCOUNTER
VOICEMAIL  1. Caller Name: Palak Brunner                          Call Back Number: [phone] (home)     2. Message: Pt called asking for the cost of her next appt on 08/17/21 w/PCP.  She is scheduled for her Well Woman w/ Pap appointment on that day and needed the amount she would be charged on that day.  Pt asked for a cb with the information.    3. Patient approves office to leave a detailed voicemail/MyChart message: yes

## 2021-07-28 NOTE — TELEPHONE ENCOUNTER
Phone Number Called: [phone] (home)     Call outcome: Spoke to patient regarding message below.    Message: Informed the Pt (per conference with front office Par) the charge we would collect at the office on that day would be $50.00.  Informed her we would have no Idea what the charge for the lab cost would be for the Pap.  Informed she would need to contact [employer] for that information.  Pt verbalized she knew there was a separate charge she would receive for the Lab/Sample for the Pap.

## 2021-08-12 ENCOUNTER — TELEPHONE (OUTPATIENT)
Dept: MEDICAL GROUP | Facility: PHYSICIAN GROUP | Age: 51
End: 2021-08-12

## 2021-08-12 NOTE — TELEPHONE ENCOUNTER
VOICEMAIL  1. Caller Name: Palak Brunner                      Call Back Number: [phone] (home)     2. Message: Pt rtn call asked for cb.

## 2021-08-12 NOTE — TELEPHONE ENCOUNTER
Phone Number Called: [phone] (home)     Call outcome: Spoke to patient regarding message below.    Message: Pt informed, Pt verbalized understanding, no questions on results at this time.  Pt asked about recent BC, she wanted to make sure it would be the same BC she had last year, and enough refills to last through the year.  Informed the Pt the BC sent in was the same as the last year, that PCP had sent in a four month supply with 3 refills of four months at a time which would see the Pt through for the year.  Pt asked if the BC had changed the look of the medication, informed the Pt PCP had ordered the same BC, so should be the same one she had as of last year.  No further questions at this time.

## 2021-08-12 NOTE — TELEPHONE ENCOUNTER
----- Message from SANTANA Rossi sent at 8/12/2021 12:40 PM PDT -----  Please call pt and give lab results: Noemi is negative next colon cancer screening will be due in 3 years.

## 2021-08-12 NOTE — TELEPHONE ENCOUNTER
Phone Number Called: [phone] (home)     Call outcome: Did not leave a detailed message. Requested patient to call back.    Message: cb for results

## 2021-08-17 ENCOUNTER — HOSPITAL ENCOUNTER (OUTPATIENT)
Facility: MEDICAL CENTER | Age: 51
End: 2021-08-17
Attending: NURSE PRACTITIONER
Payer: COMMERCIAL

## 2021-08-17 ENCOUNTER — OFFICE VISIT (OUTPATIENT)
Dept: MEDICAL GROUP | Facility: PHYSICIAN GROUP | Age: 51
End: 2021-08-17
Payer: COMMERCIAL

## 2021-08-17 VITALS
DIASTOLIC BLOOD PRESSURE: 76 MMHG | RESPIRATION RATE: 20 BRPM | TEMPERATURE: 97.4 F | HEART RATE: 80 BPM | OXYGEN SATURATION: 98 % | HEIGHT: 65 IN | BODY MASS INDEX: 26.36 KG/M2 | WEIGHT: 158.2 LBS | SYSTOLIC BLOOD PRESSURE: 118 MMHG

## 2021-08-17 DIAGNOSIS — Z00.00 WELLNESS EXAMINATION: ICD-10-CM

## 2021-08-17 DIAGNOSIS — Z12.4 SCREENING FOR CERVICAL CANCER: ICD-10-CM

## 2021-08-17 PROCEDURE — 87491 CHLMYD TRACH DNA AMP PROBE: CPT

## 2021-08-17 PROCEDURE — 99396 PREV VISIT EST AGE 40-64: CPT | Performed by: NURSE PRACTITIONER

## 2021-08-17 PROCEDURE — 87591 N.GONORRHOEAE DNA AMP PROB: CPT

## 2021-08-17 PROCEDURE — 87624 HPV HI-RISK TYP POOLED RSLT: CPT

## 2021-08-17 PROCEDURE — 88175 CYTOPATH C/V AUTO FLUID REDO: CPT

## 2021-08-17 PROCEDURE — 99000 SPECIMEN HANDLING OFFICE-LAB: CPT | Performed by: NURSE PRACTITIONER

## 2021-08-17 ASSESSMENT — FIBROSIS 4 INDEX: FIB4 SCORE: 0.78

## 2021-08-17 NOTE — PROGRESS NOTES
Subjective:     CC:   Chief Complaint   Patient presents with   • Gynecologic Exam   • Lab Results     Lipids concerns       HPI:   Palak Brunner is a 51 y.o. female who presents for annual exam    Patient has GYN provider: No   Last Pap Smear: today   H/O Abnormal Pap: No  Last Mammogram: 2021  Last Bone Density Test: n/a  Last Colorectal Cancer Screening: due   Last Tdap:   Received HPV series: No    Exercise: moderate regular exercise, aerobic < 3 days a week  Diet: decreasing carbs      Did discuss patient's lab results, slightly elevated lipids.  Patient is working on diet and exercise to improve these numbers.    No LMP recorded.  She has not utilized hormone replacement therapy.  Denies any menopausal symptoms.  No significant bloating/fluid retention, pelvic pain, or dyspareunia. No abnormal vaginal discharge.   No breast tenderness, mass, nipple discharge or changes in size or contour.    OB History    Para Term  AB Living   1 1 1 0 0 0   SAB TAB Ectopic Molar Multiple Live Births   0 0 0 0 0 0      She  reports being sexually active and has had partner(s) who are male. She reports using the following method of birth control/protection: Pill.    She  has a past medical history of Bilateral breast cysts (2011), Gastritis (2014), and Urinary incontinence (). She also has no past medical history of Breast cancer (HCC).  She  has a past surgical history that includes cholecystectomy; primary c section; US-CYST ASPIRATION-BREAST INITIAL; hemorrhoidectomy (2017); and lesion excision general (Left, 2017).    Family History   Problem Relation Age of Onset   • Diabetes Neg Hx    • Heart Disease Neg Hx    • Cancer Neg Hx    • Stroke Neg Hx      Social History     Tobacco Use   • Smoking status: Never Smoker   • Smokeless tobacco: Never Used   • Tobacco comment: aviod all tobacco products   Vaping Use   • Vaping Use: Never used   Substance Use Topics   •  "Alcohol use: No     Alcohol/week: 0.0 oz   • Drug use: No       Patient Active Problem List    Diagnosis Date Noted   • Menopause 06/15/2021   • Joint pain 06/15/2021   • Keloid scar 11/30/2018   • Seborrheic keratoses 06/12/2018   • Fibrous histiocytoma of skin 12/18/2017   • Stress incontinence 09/22/2017   • Varicose veins of both lower extremities 09/22/2017   • Bilateral breast cysts 06/01/2011     Current Outpatient Medications   Medication Sig Dispense Refill   • levonorgestrel-ethinyl estradiol (AVIANE) 0.1-20 MG-MCG per tablet TAKE ONE TABLET BY MOUTH ONE TIME DAILY 84 tablet 3   • Collagen Hydrolysate Powder 3 times a day before each meal.       No current facility-administered medications for this visit.     No Known Allergies    Review of Systems   Constitutional: Negative for fever, chills and malaise/fatigue.   HENT: Negative for congestion.    Eyes: Negative for pain.   Respiratory: Negative for cough and shortness of breath.    Cardiovascular: Negative for chest pain and leg swelling.   Gastrointestinal: Negative for nausea, vomiting, abdominal pain and diarrhea.   Genitourinary: Negative for dysuria and hematuria.   Skin: Negative for rash.   Neurological: Negative for dizziness, focal weakness and headaches.   Endo/Heme/Allergies: Does not bruise/bleed easily.   Psychiatric/Behavioral: Negative for depression.  The patient is not nervous/anxious.      Objective:   /76 (BP Location: Right arm, Patient Position: Sitting, BP Cuff Size: Adult)   Pulse 80   Temp 36.3 °C (97.4 °F) (Temporal)   Resp 20   Ht 1.651 m (5' 5\")   Wt 71.8 kg (158 lb 3.2 oz)   SpO2 98%   BMI 26.33 kg/m²     Wt Readings from Last 4 Encounters:   08/17/21 71.8 kg (158 lb 3.2 oz)   06/15/21 73.4 kg (161 lb 12.8 oz)   09/18/19 63.4 kg (139 lb 12.8 oz)   11/30/18 68.5 kg (151 lb)       A chaperone was offered to the patient during today's exam. Patient declined chaperone.    Physical Exam:  Constitutional: Well-developed " and well-nourished. Not diaphoretic. No distress.   Skin: Skin is warm and dry. No rash noted.  Head: Atraumatic without lesions.  Eyes: Conjunctivae and extraocular motions are normal. Pupils are equal, round, and reactive to light. No scleral icterus.   Ears:  External ears unremarkable. Tympanic membranes clear and intact.  Nose: Nares patent. Septum midline. Turbinates without erythema nor edema. No discharge.   Mouth/Throat: Tongue normal. Oropharynx is clear and moist. Posterior pharynx without erythema or exudates.  Neck: Supple, trachea midline. Normal range of motion. No thyromegaly present. No lymphadenopathy--cervical or supraclavicular.  Cardiovascular: Regular rate and rhythm, S1 and S2 without murmur, rubs, or gallops.    Respiratory: Effort normal. Clear to auscultation throughout. No adventitious sounds.   Breast: Breasts examined seated and supine. No skin changes, peau d'orange or nipple retraction. No discharge. No axillary or supraclavicular adenopathy. No masses or nodularity palpable.  Abdomen: Soft, non tender, and without distention. Active bowel sounds in all four quadrants. No rebound, guarding, masses or HSM.  : Perineum and external genitalia normal without rash. Vagina with normal and physiologic discharge. Cervix without visible lesions or discharge. Bimanual exam without adnexal masses or cervical motion tenderness.  Extremities: No cyanosis, clubbing, erythema, nor edema. Distal pulses intact and symmetric.   Musculoskeletal: All major joints AROM full in all directions without pain.  Neurological: Alert and oriented x 3. Grossly non-focal. Strength and sensation grossly intact. DTRs 2+/3 and symmetric.   Psychiatric:  Behavior, mood, and affect are appropriate.    Assessment and Plan:     1. Screening for cervical cancer  - THINPREP PAP W/HPV AND CTNG; Future    2. Wellness examination    We did also discuss that it is okay for her to get the Covid vaccine.      Health maintenance:      Labs per orders  Immunizations per orders  Patient counseled about skin care, diet, supplements, and exercise.  Discussed  breast self exam, mammography screening, menopause, adequate intake of calcium and vitamin D, diet and exercise, Kegel exercises, colorectal cancer screening     Follow-up: Return in about 1 year (around 8/17/2022), or if symptoms worsen or fail to improve.

## 2021-08-18 LAB
C TRACH DNA GENITAL QL NAA+PROBE: NEGATIVE
CYTOLOGY REG CYTOL: NORMAL
HPV HR 12 DNA CVX QL NAA+PROBE: NEGATIVE
HPV16 DNA SPEC QL NAA+PROBE: NEGATIVE
HPV18 DNA SPEC QL NAA+PROBE: NEGATIVE
N GONORRHOEA DNA GENITAL QL NAA+PROBE: NEGATIVE
SPECIMEN SOURCE: NORMAL
SPECIMEN SOURCE: NORMAL

## 2021-08-19 ENCOUNTER — TELEPHONE (OUTPATIENT)
Dept: MEDICAL GROUP | Facility: PHYSICIAN GROUP | Age: 51
End: 2021-08-19

## 2021-08-19 NOTE — TELEPHONE ENCOUNTER
Phone Number Called: [phone] (home)     Call outcome: Spoke to patient regarding message below.    Message: Pt informed, Pt verbalized understanding, no questions at this time.

## 2021-08-19 NOTE — TELEPHONE ENCOUNTER
----- Message from ASHLEY Rossi.P.RFrancisNFrancis sent at 8/19/2021  2:14 PM PDT -----  Please call pt and give lab results: Pap smear is 100% within normal limits and negative for infection.  You are also negative for HPV.  Next Pap smear will be due in 5 years.

## 2021-09-12 DIAGNOSIS — Z30.41 ENCOUNTER FOR SURVEILLANCE OF CONTRACEPTIVE PILLS: ICD-10-CM

## 2021-09-13 DIAGNOSIS — Z30.41 ENCOUNTER FOR SURVEILLANCE OF CONTRACEPTIVE PILLS: ICD-10-CM

## 2021-09-13 RX ORDER — LEVONORGESTREL AND ETHINYL ESTRADIOL 0.1-0.02MG
KIT ORAL
Qty: 84 TABLET | Refills: 0 | OUTPATIENT
Start: 2021-09-13

## 2021-09-14 RX ORDER — LEVONORGESTREL AND ETHINYL ESTRADIOL 0.1-0.02MG
KIT ORAL
Qty: 84 TABLET | Refills: 3 | Status: SHIPPED | OUTPATIENT
Start: 2021-09-14 | End: 2022-08-29

## 2021-09-14 NOTE — TELEPHONE ENCOUNTER
Phone Number Called: [phone] (home)     Call outcome: Did not leave a detailed message. Requested patient to call back.    Message: LVM for Pt to cb and inform if she is still using this medication.

## 2021-09-14 NOTE — TELEPHONE ENCOUNTER
VOICEMAIL  1. Caller Name: Palak Brunner                      Call Back Number: [phone] (home)     2. Message: Pt rtn call and said yes she is still using this medication.

## 2022-08-29 ENCOUNTER — OFFICE VISIT (OUTPATIENT)
Dept: MEDICAL GROUP | Facility: PHYSICIAN GROUP | Age: 52
End: 2022-08-29
Payer: COMMERCIAL

## 2022-08-29 VITALS
HEART RATE: 81 BPM | DIASTOLIC BLOOD PRESSURE: 76 MMHG | SYSTOLIC BLOOD PRESSURE: 110 MMHG | WEIGHT: 147 LBS | OXYGEN SATURATION: 100 % | TEMPERATURE: 98 F | BODY MASS INDEX: 27.05 KG/M2 | HEIGHT: 62 IN

## 2022-08-29 DIAGNOSIS — Z00.00 WELLNESS EXAMINATION: ICD-10-CM

## 2022-08-29 DIAGNOSIS — Z12.31 ENCOUNTER FOR SCREENING MAMMOGRAM FOR MALIGNANT NEOPLASM OF BREAST: ICD-10-CM

## 2022-08-29 DIAGNOSIS — N39.3 STRESS INCONTINENCE: ICD-10-CM

## 2022-08-29 DIAGNOSIS — Z78.0 MENOPAUSE: ICD-10-CM

## 2022-08-29 PROCEDURE — 99396 PREV VISIT EST AGE 40-64: CPT | Performed by: NURSE PRACTITIONER

## 2022-08-29 ASSESSMENT — FIBROSIS 4 INDEX: FIB4 SCORE: 0.79

## 2022-08-29 ASSESSMENT — PATIENT HEALTH QUESTIONNAIRE - PHQ9: CLINICAL INTERPRETATION OF PHQ2 SCORE: 0

## 2022-08-29 NOTE — LETTER
August 29, 2022        Palak Arellanos  [address] [place] [place] [facility] [address]        To whom it may concern:    Please excuse Palak from work 8/29/2022 related to appointment at our office.     If you have any questions or concerns, please don't hesitate to call.        Sincerely,        ASHLEY Rossi.CHERY.    Electronically Signed

## 2022-08-29 NOTE — PROGRESS NOTES
Subjective:     CC:   Chief Complaint   Patient presents with    Annual Exam    Orders Needed     mammogram    Requesting Labs       HPI:   Palak Brunner is a 52 y.o. female who presents for annual exam. She is feeling well and denies any complaints.    Patient is positive for stress incontinence.  states that she is having almost daily symptoms, will notice it with lifting, walking quickly, coughing, laughing.  Has tried pelvic floor physical therapy without benefit, is working on Kegel's but does not do them regularly handout provided.  Requesting referral to urology.    Ob-Gyn/ History:    Patient has GYN provider: no  /Para:    Last Pap Smear:  . none history of abnormal pap smears.  Gyn Surgery:  none.  Current Contraceptive Method:  none x4-5 months. is currently sexually active.  Last menstrual period:  5 months ago, withdrawal bleeding from BCP.  No significant bloating/fluid retention, pelvic pain, or dyspareunia. No vaginal discharge  Post-menopausal bleeding: none  Urinary incontinence: yes, especially with lifting or exercise/laughing coughing.   Multivitamin +    Health Maintenance  Takes calcium vitamin d  Cholesterol Screening: ordered   Diabetes Screening: ordered   Diet: increased, not a lot of processed foods.    Exercise: daily 5x/week. walking  Substance Abuse: no concerned   Safe in relationship.   Seat belts, bike helmet, gun safety discussed.  Sun protection used.    Cancer screening  Colorectal Cancer Screening: completed    Cervical Cancer Screening: pap in    Breast Cancer Screening: order today     Infectious disease screening/Immunizations  --STI Screening: no needed   --Practices safe sex.  --HIV Screening: complete in past   --Immunizations: declines    She  has a past medical history of Bilateral breast cysts (2011), Gastritis (2014), and Urinary incontinence ().    She has no past medical history of Breast cancer (HCC).  She  has a past  surgical history that includes cholecystectomy; primary c section; US-CYST ASPIRATION-BREAST INITIAL; hemorrhoidectomy (06/22/2017); lesion excision general (Left, 12/18/2017); and laser ablation (Left, 06/10/2022).    Family History   Problem Relation Age of Onset    Diabetes Neg Hx     Heart Disease Neg Hx     Cancer Neg Hx     Stroke Neg Hx        Social History     Socioeconomic History    Marital status:      Spouse name: Not on file    Number of children: Not on file    Years of education: Not on file    Highest education level: Not on file   Occupational History    Not on file   Tobacco Use    Smoking status: Never    Smokeless tobacco: Never    Tobacco comments:     aviod all tobacco products   Vaping Use    Vaping Use: Never used   Substance and Sexual Activity    Alcohol use: No     Alcohol/week: 0.0 oz    Drug use: No    Sexual activity: Yes     Partners: Male     Birth control/protection: Pill   Other Topics Concern    Not on file   Social History Narrative    Not on file     Social Determinants of Health     Financial Resource Strain: Not on file   Food Insecurity: Not on file   Transportation Needs: Not on file   Physical Activity: Not on file   Stress: Not on file   Social Connections: Not on file   Intimate Partner Violence: Not on file   Housing Stability: Not on file       Patient Active Problem List    Diagnosis Date Noted    Menopause 06/15/2021    Joint pain 06/15/2021    Keloid scar 11/30/2018    Seborrheic keratoses 06/12/2018    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     No current facility-administered medications for this visit.     No Known Allergies    Review of Systems   Constitutional: Negative for fever, chills and malaise/fatigue.   HENT: Negative for congestion.    Eyes: Negative for pain.  "  Respiratory: Negative for cough and shortness of breath.    Cardiovascular: Negative for leg swelling.   Gastrointestinal: Negative for nausea, vomiting, abdominal pain and diarrhea.   Genitourinary: Negative for dysuria and hematuria.   Skin: Negative for rash.   Neurological: Negative for dizziness, focal weakness and headaches.   Endo/Heme/Allergies: Does not bruise/bleed easily.   Psychiatric/Behavioral: Negative for depression.  The patient is not nervous/anxious.      Objective:     /76 (BP Location: Left arm, Patient Position: Sitting, BP Cuff Size: Adult)   Pulse 81   Temp 36.7 °C (98 °F) (Temporal)   Ht 1.575 m (5' 2\")   Wt 66.7 kg (147 lb)   SpO2 100%   BMI 26.89 kg/m²   Body mass index is 26.89 kg/m².  Wt Readings from Last 4 Encounters:   08/29/22 66.7 kg (147 lb)   08/17/21 71.8 kg (158 lb 3.2 oz)   06/15/21 73.4 kg (161 lb 12.8 oz)   09/18/19 63.4 kg (139 lb 12.8 oz)       Physical Exam:  Constitutional: Well-developed and well-nourished. Not diaphoretic. No distress.   Skin: Skin is warm and dry. No rash noted.  Head: Atraumatic without lesions.  Eyes: Conjunctivae and extraocular motions are normal. Pupils are equal, round, and reactive to light. No scleral icterus.   Ears:  External ears unremarkable. Tympanic membranes clear and intact.  Nose: Nares patent. Septum midline. Turbinates without erythema nor edema. No discharge.   Mouth/Throat: Dentition is WNL. Tongue normal. Oropharynx is clear and moist. Posterior pharynx without erythema or exudates.  Neck: Supple, trachea midline. Normal range of motion. No thyromegaly present. No lymphadenopathy--cervical or supraclavicular.  Cardiovascular: Regular rate and rhythm, S1 and S2 without murmur, rubs, or gallops.  Lungs: Normal inspiratory effort, CTA bilaterally, no wheezes/rhonchi/rales    Abdomen: Soft, non tender, and without distention. Active bowel sounds in all four quadrants. No rebound, guarding, masses or HSM.  Extremities: No " cyanosis, clubbing, erythema, nor edema. Distal pulses intact and symmetric.   Musculoskeletal: All major joints AROM full in all directions without pain.  Neurological: Alert and oriented x 3. DTRs 2+/3 and symmetric. No cranial nerve deficit. 5/5 myotomes. Sensation intact. Negative Rhomberg.  Psychiatric:  Behavior, mood, and affect are appropriate.        Assessment and Plan:     1. Menopause  ESTRADIOL    FSH/LH    PROGESTERONE      2. Wellness examination  Lipid Profile    Comp Metabolic Panel      3. Stress incontinence  Referral to Urology      4. Encounter for screening mammogram for malignant neoplasm of breast  MA-SCREENING MAMMO BILAT W/ IMPLANTS W/CAD          HCM: Stress incontinence without benefit from conservative management patient is requesting referral to urology which is provided today.  Labs per orders  Immunizations per orders  Patient counseled about skin care, diet, supplements, prenatal vitamins, safe sex and exercise.    Follow-up: Return if symptoms worsen or fail to improve.

## 2022-09-01 ENCOUNTER — APPOINTMENT (OUTPATIENT)
Dept: RADIOLOGY | Facility: MEDICAL CENTER | Age: 52
End: 2022-09-01
Attending: NURSE PRACTITIONER
Payer: COMMERCIAL

## 2022-09-08 ENCOUNTER — HOSPITAL ENCOUNTER (OUTPATIENT)
Dept: LAB | Facility: MEDICAL CENTER | Age: 52
End: 2022-09-08
Attending: NURSE PRACTITIONER
Payer: COMMERCIAL

## 2022-09-08 DIAGNOSIS — Z00.00 WELLNESS EXAMINATION: ICD-10-CM

## 2022-09-08 DIAGNOSIS — Z78.0 MENOPAUSE: ICD-10-CM

## 2022-09-08 LAB
ALBUMIN SERPL BCP-MCNC: 4.3 G/DL (ref 3.2–4.9)
ALBUMIN/GLOB SERPL: 1.5 G/DL
ALP SERPL-CCNC: 65 U/L (ref 30–99)
ALT SERPL-CCNC: 36 U/L (ref 2–50)
ANION GAP SERPL CALC-SCNC: 10 MMOL/L (ref 7–16)
AST SERPL-CCNC: 28 U/L (ref 12–45)
BILIRUB SERPL-MCNC: 1.3 MG/DL (ref 0.1–1.5)
BUN SERPL-MCNC: 11 MG/DL (ref 8–22)
CALCIUM SERPL-MCNC: 9 MG/DL (ref 8.5–10.5)
CHLORIDE SERPL-SCNC: 102 MMOL/L (ref 96–112)
CHOLEST SERPL-MCNC: 169 MG/DL (ref 100–199)
CO2 SERPL-SCNC: 28 MMOL/L (ref 20–33)
CREAT SERPL-MCNC: 0.54 MG/DL (ref 0.5–1.4)
ESTRADIOL SERPL-MCNC: 8.7 PG/ML
FSH SERPL-ACNC: 67.7 MIU/ML
GFR SERPLBLD CREATININE-BSD FMLA CKD-EPI: 110 ML/MIN/1.73 M 2
GLOBULIN SER CALC-MCNC: 2.9 G/DL (ref 1.9–3.5)
GLUCOSE SERPL-MCNC: 87 MG/DL (ref 65–99)
HDLC SERPL-MCNC: 47 MG/DL
LDLC SERPL CALC-MCNC: 104 MG/DL
LH SERPL-ACNC: 48 IU/L
POTASSIUM SERPL-SCNC: 3.9 MMOL/L (ref 3.6–5.5)
PROGEST SERPL-MCNC: 0.19 NG/ML
PROT SERPL-MCNC: 7.2 G/DL (ref 6–8.2)
SODIUM SERPL-SCNC: 140 MMOL/L (ref 135–145)
TRIGL SERPL-MCNC: 88 MG/DL (ref 0–149)

## 2022-09-08 PROCEDURE — 83001 ASSAY OF GONADOTROPIN (FSH): CPT

## 2022-09-08 PROCEDURE — 80053 COMPREHEN METABOLIC PANEL: CPT

## 2022-09-08 PROCEDURE — 83002 ASSAY OF GONADOTROPIN (LH): CPT

## 2022-09-08 PROCEDURE — 80061 LIPID PANEL: CPT

## 2022-09-08 PROCEDURE — 84144 ASSAY OF PROGESTERONE: CPT

## 2022-09-08 PROCEDURE — 36415 COLL VENOUS BLD VENIPUNCTURE: CPT

## 2022-09-08 PROCEDURE — 82670 ASSAY OF TOTAL ESTRADIOL: CPT

## 2022-09-09 ENCOUNTER — TELEPHONE (OUTPATIENT)
Dept: MEDICAL GROUP | Facility: PHYSICIAN GROUP | Age: 52
End: 2022-09-09
Payer: COMMERCIAL

## 2022-09-09 NOTE — TELEPHONE ENCOUNTER
----- Message from MARY RossiPFrancisRRADHA sent at 9/8/2022  4:50 PM PDT -----  Please call pt and give lab results: hormones levels indicate post-menopausal.  Electrolytes, kidney, liver function all within normal limits.  LDL is slightly increased at 104 this is improved from a year ago.

## 2022-09-09 NOTE — TELEPHONE ENCOUNTER
Phone Number Called: [phone] (home)     Call outcome: Spoke to patient regarding message below.    Message: gave results, no questions

## 2022-09-21 ENCOUNTER — HOSPITAL ENCOUNTER (OUTPATIENT)
Dept: RADIOLOGY | Facility: MEDICAL CENTER | Age: 52
End: 2022-09-21
Attending: NURSE PRACTITIONER
Payer: COMMERCIAL

## 2022-09-21 DIAGNOSIS — Z12.31 ENCOUNTER FOR SCREENING MAMMOGRAM FOR MALIGNANT NEOPLASM OF BREAST: ICD-10-CM

## 2022-09-21 PROCEDURE — 77063 BREAST TOMOSYNTHESIS BI: CPT

## 2022-10-03 ENCOUNTER — TELEPHONE (OUTPATIENT)
Dept: MEDICAL GROUP | Facility: PHYSICIAN GROUP | Age: 52
End: 2022-10-03
Payer: COMMERCIAL

## 2022-10-03 DIAGNOSIS — Z12.31 ENCOUNTER FOR SCREENING MAMMOGRAM FOR MALIGNANT NEOPLASM OF BREAST: ICD-10-CM

## 2022-10-03 DIAGNOSIS — R92.2 DENSE BREASTS: ICD-10-CM

## 2022-10-03 DIAGNOSIS — R92.30 DENSE BREASTS: ICD-10-CM

## 2022-10-03 NOTE — TELEPHONE ENCOUNTER
VOICEMAIL  1. Caller Name: Palak Brunner                        Call Back Number: [phone] (home)       2. Message: pt requesting breast US order    3. Patient approves office to leave a detailed voicemail/MyChart message: no

## 2022-10-13 ENCOUNTER — TELEPHONE (OUTPATIENT)
Dept: MEDICAL GROUP | Facility: PHYSICIAN GROUP | Age: 52
End: 2022-10-13
Payer: COMMERCIAL

## 2022-10-19 ENCOUNTER — HOSPITAL ENCOUNTER (OUTPATIENT)
Facility: MEDICAL CENTER | Age: 52
End: 2022-10-19
Attending: STUDENT IN AN ORGANIZED HEALTH CARE EDUCATION/TRAINING PROGRAM
Payer: COMMERCIAL

## 2022-10-19 PROCEDURE — 81001 URINALYSIS AUTO W/SCOPE: CPT

## 2022-10-20 LAB
APPEARANCE UR: CLEAR
BACTERIA #/AREA URNS HPF: NEGATIVE /HPF
BILIRUB UR QL STRIP.AUTO: NEGATIVE
COLOR UR: YELLOW
EPI CELLS #/AREA URNS HPF: NEGATIVE /HPF
GLUCOSE UR STRIP.AUTO-MCNC: NEGATIVE MG/DL
HYALINE CASTS #/AREA URNS LPF: NORMAL /LPF
KETONES UR STRIP.AUTO-MCNC: NEGATIVE MG/DL
LEUKOCYTE ESTERASE UR QL STRIP.AUTO: ABNORMAL
MICRO URNS: ABNORMAL
NITRITE UR QL STRIP.AUTO: NEGATIVE
PH UR STRIP.AUTO: 7.5 [PH] (ref 5–8)
PROT UR QL STRIP: NEGATIVE MG/DL
RBC # URNS HPF: NORMAL /HPF
RBC UR QL AUTO: NEGATIVE
SP GR UR STRIP.AUTO: 1.01
UROBILINOGEN UR STRIP.AUTO-MCNC: 0.2 MG/DL
WBC #/AREA URNS HPF: NORMAL /HPF

## 2023-03-07 ENCOUNTER — OFFICE VISIT (OUTPATIENT)
Dept: MEDICAL GROUP | Facility: PHYSICIAN GROUP | Age: 53
End: 2023-03-07
Payer: COMMERCIAL

## 2023-03-07 VITALS
BODY MASS INDEX: 27.23 KG/M2 | HEIGHT: 62 IN | TEMPERATURE: 97.9 F | DIASTOLIC BLOOD PRESSURE: 60 MMHG | WEIGHT: 148 LBS | HEART RATE: 80 BPM | OXYGEN SATURATION: 95 % | SYSTOLIC BLOOD PRESSURE: 100 MMHG

## 2023-03-07 DIAGNOSIS — E01.0 THYROMEGALY: ICD-10-CM

## 2023-03-07 DIAGNOSIS — R59.0 CERVICAL LYMPHADENOPATHY: ICD-10-CM

## 2023-03-07 DIAGNOSIS — M25.511 CHRONIC RIGHT SHOULDER PAIN: ICD-10-CM

## 2023-03-07 DIAGNOSIS — H04.123 DRY EYES: ICD-10-CM

## 2023-03-07 DIAGNOSIS — G89.29 CHRONIC RIGHT SHOULDER PAIN: ICD-10-CM

## 2023-03-07 PROCEDURE — 99214 OFFICE O/P EST MOD 30 MIN: CPT | Performed by: NURSE PRACTITIONER

## 2023-03-07 ASSESSMENT — ENCOUNTER SYMPTOMS
EYE REDNESS: 1
WEIGHT LOSS: 0
SENSORY CHANGE: 0
MYALGIAS: 0
FEVER: 0
CHILLS: 0
WEAKNESS: 0
VOMITING: 0
NAUSEA: 0
PHOTOPHOBIA: 0
BLURRED VISION: 0
HEADACHES: 0
DIZZINESS: 0
DIARRHEA: 0
NERVOUS/ANXIOUS: 0
SORE THROAT: 0
SINUS PAIN: 0
EYE PAIN: 0
TINGLING: 0
ABDOMINAL PAIN: 0
DOUBLE VISION: 0
SHORTNESS OF BREATH: 0
COUGH: 0
PALPITATIONS: 0

## 2023-03-07 ASSESSMENT — PATIENT HEALTH QUESTIONNAIRE - PHQ9: CLINICAL INTERPRETATION OF PHQ2 SCORE: 0

## 2023-03-07 ASSESSMENT — FIBROSIS 4 INDEX: FIB4 SCORE: 0.94

## 2023-03-07 NOTE — LETTER
March 7, 2023        Palak Brunner  [address] [facility] [address]        To Whom it May Concern:    I recommend that Palak work no more than 40 hours per week over the next 8 weeks to heal from a shoulder injury.    If you have any questions or concerns, please don't hesitate to call.        Sincerely,        ASHLEY Rossi.CHERY.    Electronically Signed

## 2023-03-08 NOTE — ASSESSMENT & PLAN NOTE
- Recommended artifical tears PRN   - Schedule appointment with ophthalmologist for evaluation   - Return if eye symptoms worsen, eye pain, blurred vision does not improve or vision loss

## 2023-03-08 NOTE — PROGRESS NOTES
Subjective:     Chief Complaint   Patient presents with    Other     Throat/front of neck palpitations; x 1 month       HPI:   Palak presents today with     Problem   Thyromegaly    This is a new issue for this patient. Reports intermittent pulsations located in mid neck region x 4 weeks. Each episode consists of 3 pulsations that then stop. The pulsations occur sporadically through the day with no known trigger or exacerbating factor. The pulsations alleviate without intervention and no additional symptoms are experienced during these occurrences. States she has swollen cervical lymph nodes x 1 year with no recent illness or sick contacts. No recent thyroid labs performed. She denies chest pain, no dysphagia. Denies anxiety, no caffeine or alcohol intake. Denies fever, no throat pain. No cough or heat/cold intolerance.        Shoulder Pain, Right    Chronic in nature. Reports right shoulder pain radiating to her right wrist related to repetitive movements at work. ROM intact. Denies numbness/tingling or loss of sensation in right arm/digits.       Dry Eyes    This is a new issue for this patient. Reports dryness in bilateral eyes with associated redness. States the dry air and working long work days exacerbates her symptoms. Her vision is slightly blurry when experiencing dryness symptoms.           No current [facility]-ordered outpatient medications on file.     No current [facility]-ordered facility-administered medications on file.       Health Maintenance: Denies influenza vaccination     Review of Systems   Constitutional:  Negative for chills, fever, malaise/fatigue and weight loss.   HENT:  Negative for congestion, ear pain, hearing loss, sinus pain and sore throat.    Eyes:  Positive for redness. Negative for blurred vision, double vision, photophobia and pain.   Respiratory:  Negative for cough and shortness of breath.    Cardiovascular:  Negative for chest pain, palpitations and leg swelling.   Gastrointestinal:   "Negative for abdominal pain, diarrhea, nausea and vomiting.   Genitourinary:  Negative for dysuria, frequency and urgency.   Musculoskeletal:  Positive for joint pain (right shoulder). Negative for myalgias.   Skin:  Negative for rash.   Neurological:  Negative for dizziness, tingling, sensory change, weakness and headaches.   Endo/Heme/Allergies:  Positive for environmental allergies.   Psychiatric/Behavioral:  The patient is not nervous/anxious.        Objective:     Exam:  /60 (BP Location: Left arm, Patient Position: Sitting, BP Cuff Size: Adult)   Pulse 80   Temp 36.6 °C (97.9 °F) (Temporal)   Ht 1.575 m (5' 2\")   Wt 67.1 kg (148 lb)   SpO2 95%   BMI 27.07 kg/m²  Body mass index is 27.07 kg/m².    Physical Exam  Vitals reviewed.   Constitutional:       Appearance: Normal appearance.   HENT:      Right Ear: Tympanic membrane normal.      Left Ear: Tympanic membrane normal.      Mouth/Throat:      Pharynx: No oropharyngeal exudate or posterior oropharyngeal erythema.      Tonsils: 3+ on the right. 3+ on the left.   Eyes:      General: Lids are normal.      Extraocular Movements:      Right eye: No nystagmus.      Left eye: No nystagmus.      Conjunctiva/sclera:      Right eye: Right conjunctiva is injected.      Left eye: Left conjunctiva is not injected.      Pupils: Pupils are equal, round, and reactive to light.   Neck:      Thyroid: Thyromegaly present.   Cardiovascular:      Rate and Rhythm: Normal rate and regular rhythm.      Pulses: Normal pulses.      Heart sounds: Normal heart sounds.   Pulmonary:      Effort: Pulmonary effort is normal.      Breath sounds: Normal breath sounds.   Musculoskeletal:      Right shoulder: Tenderness present. No swelling or deformity. Normal range of motion. Normal pulse.      Cervical back: Normal range of motion and neck supple. Tenderness present. No rigidity.   Lymphadenopathy:      Cervical: Cervical adenopathy present.   Neurological:      Mental Status: " She is alert.   Psychiatric:         Mood and Affect: Mood normal.         Behavior: Behavior normal.     Assessment & Plan:     52 y.o. female with the following -     Problem List Items Addressed This Visit       Dry eyes     - Recommended artifical tears PRN   - Schedule appointment with ophthalmologist for evaluation   - Return if eye symptoms worsen, eye pain, blurred vision does not improve or vision loss          Shoulder pain, right     - Work note for 40 hour work week limitation to reduce overuse and strain   - Therapeutic ROM stretches   - Tylenol, ibuprofen PRN            Thyromegaly     - US soft tissues head and neck   - CBC with differential   - TSH with reflex to T4   - CMP          Relevant Orders    US-SOFT TISSUES OF HEAD - NECK    CBC WITH DIFFERENTIAL    Comp Metabolic Panel    TSH WITH REFLEX TO FT4     Other Visit Diagnoses       Cervical lymphadenopathy        Relevant Orders    US-SOFT TISSUES OF HEAD - NECK            Return if symptoms worsen or fail to improve, for Based on results of labs and imaging .      Please note that this dictation was created using voice recognition software. I have made every reasonable attempt to correct obvious errors, but I expect that there are errors of grammar and possibly content that I did not discover before finalizing the note.

## 2023-03-08 NOTE — ASSESSMENT & PLAN NOTE
- Work note for 40 hour work week limitation to reduce overuse and strain   - Therapeutic ROM stretches   - Tylenol, ibuprofen PRN

## 2023-03-10 ENCOUNTER — HOSPITAL ENCOUNTER (OUTPATIENT)
Dept: LAB | Facility: MEDICAL CENTER | Age: 53
End: 2023-03-10
Attending: NURSE PRACTITIONER
Payer: COMMERCIAL

## 2023-03-10 DIAGNOSIS — E01.0 THYROMEGALY: ICD-10-CM

## 2023-03-10 LAB
ALBUMIN SERPL BCP-MCNC: 4.5 G/DL (ref 3.2–4.9)
ALBUMIN/GLOB SERPL: 1.5 G/DL
ALP SERPL-CCNC: 58 U/L (ref 30–99)
ALT SERPL-CCNC: 39 U/L (ref 2–50)
ANION GAP SERPL CALC-SCNC: 12 MMOL/L (ref 7–16)
AST SERPL-CCNC: 25 U/L (ref 12–45)
BASOPHILS # BLD AUTO: 0.5 % (ref 0–1.8)
BASOPHILS # BLD: 0.03 K/UL (ref 0–0.12)
BILIRUB SERPL-MCNC: 0.6 MG/DL (ref 0.1–1.5)
BUN SERPL-MCNC: 10 MG/DL (ref 8–22)
CALCIUM ALBUM COR SERPL-MCNC: 9 MG/DL (ref 8.5–10.5)
CALCIUM SERPL-MCNC: 9.4 MG/DL (ref 8.5–10.5)
CHLORIDE SERPL-SCNC: 103 MMOL/L (ref 96–112)
CO2 SERPL-SCNC: 27 MMOL/L (ref 20–33)
CREAT SERPL-MCNC: 0.44 MG/DL (ref 0.5–1.4)
EOSINOPHIL # BLD AUTO: 0.15 K/UL (ref 0–0.51)
EOSINOPHIL NFR BLD: 2.4 % (ref 0–6.9)
ERYTHROCYTE [DISTWIDTH] IN BLOOD BY AUTOMATED COUNT: 43.8 FL (ref 35.9–50)
GFR SERPLBLD CREATININE-BSD FMLA CKD-EPI: 115 ML/MIN/1.73 M 2
GLOBULIN SER CALC-MCNC: 3 G/DL (ref 1.9–3.5)
GLUCOSE SERPL-MCNC: 93 MG/DL (ref 65–99)
HCT VFR BLD AUTO: 43.4 % (ref 37–47)
HGB BLD-MCNC: 14.3 G/DL (ref 12–16)
IMM GRANULOCYTES # BLD AUTO: 0.02 K/UL (ref 0–0.11)
IMM GRANULOCYTES NFR BLD AUTO: 0.3 % (ref 0–0.9)
LYMPHOCYTES # BLD AUTO: 2.06 K/UL (ref 1–4.8)
LYMPHOCYTES NFR BLD: 33.6 % (ref 22–41)
MCH RBC QN AUTO: 30.4 PG (ref 27–33)
MCHC RBC AUTO-ENTMCNC: 32.9 G/DL (ref 33.6–35)
MCV RBC AUTO: 92.1 FL (ref 81.4–97.8)
MONOCYTES # BLD AUTO: 0.47 K/UL (ref 0–0.85)
MONOCYTES NFR BLD AUTO: 7.7 % (ref 0–13.4)
NEUTROPHILS # BLD AUTO: 3.41 K/UL (ref 2–7.15)
NEUTROPHILS NFR BLD: 55.5 % (ref 44–72)
NRBC # BLD AUTO: 0 K/UL
NRBC BLD-RTO: 0 /100 WBC
PLATELET # BLD AUTO: 245 K/UL (ref 164–446)
PMV BLD AUTO: 10.6 FL (ref 9–12.9)
POTASSIUM SERPL-SCNC: 4.5 MMOL/L (ref 3.6–5.5)
PROT SERPL-MCNC: 7.5 G/DL (ref 6–8.2)
RBC # BLD AUTO: 4.71 M/UL (ref 4.2–5.4)
SODIUM SERPL-SCNC: 142 MMOL/L (ref 135–145)
TSH SERPL DL<=0.005 MIU/L-ACNC: 1.52 UIU/ML (ref 0.38–5.33)
WBC # BLD AUTO: 6.1 K/UL (ref 4.8–10.8)

## 2023-03-10 PROCEDURE — 36415 COLL VENOUS BLD VENIPUNCTURE: CPT

## 2023-03-10 PROCEDURE — 85025 COMPLETE CBC W/AUTO DIFF WBC: CPT

## 2023-03-10 PROCEDURE — 80053 COMPREHEN METABOLIC PANEL: CPT

## 2023-03-10 PROCEDURE — 84443 ASSAY THYROID STIM HORMONE: CPT

## 2023-03-14 ENCOUNTER — TELEPHONE (OUTPATIENT)
Dept: MEDICAL GROUP | Facility: PHYSICIAN GROUP | Age: 53
End: 2023-03-14
Payer: COMMERCIAL

## 2023-03-14 NOTE — TELEPHONE ENCOUNTER
Phone Number Called: [phone]     Call outcome: Left detailed message for patient. Informed to call back with any additional questions.    Message: Left vm for cb attempt 1

## 2023-03-14 NOTE — LETTER
March 15, 2023        Palak Brunner,     We have been trying to contact you regarding recent lab results. Please call [phone] and ask to speak with Nicolas Jain's medical assistant. Thank you,                                 Kassie Gómez, Med Ass't

## 2023-03-14 NOTE — TELEPHONE ENCOUNTER
----- Message from LETTY RossiRRADHA sent at 3/13/2023  5:15 PM PDT -----  Please call pt and give lab results: Red blood cells, white blood cells are all within normal limits, your electrolytes, fasting blood sugar, kidney and liver functions within normal limits.  TSH is within normal limits at 1.520.  Schedule appointment to discuss if you have further questions.

## 2023-03-27 ENCOUNTER — HOSPITAL ENCOUNTER (OUTPATIENT)
Dept: RADIOLOGY | Facility: MEDICAL CENTER | Age: 53
End: 2023-03-27
Attending: NURSE PRACTITIONER
Payer: COMMERCIAL

## 2023-03-27 ENCOUNTER — TELEPHONE (OUTPATIENT)
Dept: MEDICAL GROUP | Facility: PHYSICIAN GROUP | Age: 53
End: 2023-03-27

## 2023-03-27 DIAGNOSIS — E01.0 THYROMEGALY: ICD-10-CM

## 2023-03-27 DIAGNOSIS — E04.1 THYROID NODULE: ICD-10-CM

## 2023-03-27 DIAGNOSIS — R59.0 CERVICAL LYMPHADENOPATHY: ICD-10-CM

## 2023-03-27 PROCEDURE — 76536 US EXAM OF HEAD AND NECK: CPT

## 2023-03-27 NOTE — TELEPHONE ENCOUNTER
----- Message from Tanisha Ramirez M.D. sent at 3/27/2023  1:18 PM PDT -----  Recent thyroid ultrasound does show a 3.2 cm nodule in the right thyroid gland. They do recommend we biopsy the nodule. We can order that if you want to proceed with the biopsy.

## 2023-03-27 NOTE — TELEPHONE ENCOUNTER
Phone Number Called: [phone]     Call outcome: Left detailed message for patient. Informed to call back with any additional questions.    Message: Left vm for cb attempt 1

## 2023-03-28 ENCOUNTER — TELEPHONE (OUTPATIENT)
Dept: MEDICAL GROUP | Facility: PHYSICIAN GROUP | Age: 53
End: 2023-03-28
Payer: COMMERCIAL

## 2023-03-28 NOTE — TELEPHONE ENCOUNTER
VOICEMAIL  1. Caller Name: Palak                      Call Back Number: [phone]     2. Message: Patient left vm requesting referral be placed so she can have her biopsy done. Insurance will not cover unless referral is placed    3. Patient approves office to leave a detailed voicemail/MyChart message: yes

## 2023-03-29 ENCOUNTER — TELEPHONE (OUTPATIENT)
Dept: MEDICAL GROUP | Facility: PHYSICIAN GROUP | Age: 53
End: 2023-03-29
Payer: COMMERCIAL

## 2023-03-29 NOTE — TELEPHONE ENCOUNTER
VOICEMAIL  1. Caller Name: Palak                      Call Back Number: [phone]     2. Message: Palak left 4 vm requesting us to send Biopsy order to access to [facility] so that she can have this biopsy done.     3. Patient approves office to leave a detailed voicemail/MyChart message: N\A      FAXED ORDER

## 2023-04-14 ENCOUNTER — HOSPITAL ENCOUNTER (OUTPATIENT)
Dept: RADIOLOGY | Facility: MEDICAL CENTER | Age: 53
End: 2023-04-14
Attending: FAMILY MEDICINE
Payer: COMMERCIAL

## 2023-04-14 DIAGNOSIS — E04.1 THYROID NODULE: ICD-10-CM

## 2023-04-14 LAB — PATHOLOGY CONSULT NOTE: NORMAL

## 2023-04-14 PROCEDURE — 88173 CYTOPATH EVAL FNA REPORT: CPT

## 2023-04-14 PROCEDURE — 10005 FNA BX W/US GDN 1ST LES: CPT

## 2023-04-14 NOTE — PROGRESS NOTES
OPIR     Right Thyroid Fine Needle Aspiration done by Dr. Alcocer, Right anterior aspect of neck access site; x1 jar of cytolyt and x1 vial Affirma obtained and sent to pathology.  Patient tolerated the procedure well.    All questions and concerns answered prior to being dc'd; pt provided with appropriate education for procedure, pt discharge to home.

## 2023-05-10 ENCOUNTER — OFFICE VISIT (OUTPATIENT)
Dept: MEDICAL GROUP | Facility: PHYSICIAN GROUP | Age: 53
End: 2023-05-10
Payer: COMMERCIAL

## 2023-05-10 VITALS
BODY MASS INDEX: 28.08 KG/M2 | TEMPERATURE: 98.1 F | WEIGHT: 152.6 LBS | SYSTOLIC BLOOD PRESSURE: 94 MMHG | DIASTOLIC BLOOD PRESSURE: 62 MMHG | OXYGEN SATURATION: 97 % | HEART RATE: 85 BPM | HEIGHT: 62 IN

## 2023-05-10 DIAGNOSIS — E01.0 THYROMEGALY: ICD-10-CM

## 2023-05-10 DIAGNOSIS — Z12.31 ENCOUNTER FOR SCREENING MAMMOGRAM FOR MALIGNANT NEOPLASM OF BREAST: ICD-10-CM

## 2023-05-10 DIAGNOSIS — F33.1 MODERATE EPISODE OF RECURRENT MAJOR DEPRESSIVE DISORDER (HCC): ICD-10-CM

## 2023-05-10 PROCEDURE — 99213 OFFICE O/P EST LOW 20 MIN: CPT | Performed by: NURSE PRACTITIONER

## 2023-05-10 ASSESSMENT — ENCOUNTER SYMPTOMS
FEVER: 0
HEARTBURN: 0
CHILLS: 0
COUGH: 0
DEPRESSION: 0
WHEEZING: 0
HEADACHES: 0
ABDOMINAL PAIN: 0
DIZZINESS: 0
SHORTNESS OF BREATH: 0
PALPITATIONS: 0

## 2023-05-10 ASSESSMENT — FIBROSIS 4 INDEX: FIB4 SCORE: 0.87

## 2023-05-10 ASSESSMENT — PATIENT HEALTH QUESTIONNAIRE - PHQ9
SUM OF ALL RESPONSES TO PHQ QUESTIONS 1-9: 7
5. POOR APPETITE OR OVEREATING: 1 - SEVERAL DAYS
CLINICAL INTERPRETATION OF PHQ2 SCORE: 2

## 2023-05-10 NOTE — ASSESSMENT & PLAN NOTE
-refer to counseling for depression symptoms.   -Patient is not interested in medication management, I agree with her that overall her symptoms are mild and that counseling would likely be very helpful.

## 2023-05-10 NOTE — PROGRESS NOTES
Subjective:     Chief Complaint   Patient presents with    Referral Needed     Psychology     Results     Biopsy        HPI:   Palak presents today with     Problem   Moderate Episode of Recurrent Major Depressive Disorder (Hcc)    Depression Screening    Little interest or pleasure in doing things?  0 - not at all   Feeling down, depressed , or hopeless? 2 - more than half the days   Trouble falling or staying asleep, or sleeping too much?  1 - several days   Feeling tired or having little energy?  2 - more than half the days   Poor appetite or overeating?  1 - several days   Feeling bad about yourself - or that you are a failure or have let yourself or your family down? 1 - several days   Trouble concentrating on things, such as reading the newspaper or watching television? 0 - not at all   Moving or speaking so slowly that other people could have noticed.  Or the opposite - being so fidgety or restless that you have been moving around a lot more than usual?  0 - not at all   Thoughts that you would be better off dead, or of hurting yourself?  0 - not at all   Patient Health Questionnaire Score: 7       If depressive symptoms identified deferred to follow up visit unless specifically addressed in assesment and plan.    Interpretation of PHQ-9 Total Score   Score Severity   1-4 No Depression   5-9 Mild Depression   10-14 Moderate Depression   15-19 Moderately Severe Depression   20-27 Severe Depression         Thyromegaly    Continued issue.  Noted thyroid nodule, biopsy performed and benign at this time.  Plan to order follow-up ultrasound in 1 year.           No current [facility]-ordered outpatient medications on file.     No current [facility]-ordered facility-administered medications on file.       Health Maintenance: up to date    Review of Systems   Constitutional:  Negative for chills, fever and malaise/fatigue.   Respiratory:  Negative for cough, shortness of breath and wheezing.    Cardiovascular:  Negative for chest  "pain, palpitations and leg swelling.   Gastrointestinal:  Negative for abdominal pain and heartburn.   Neurological:  Negative for dizziness and headaches.   Psychiatric/Behavioral:  Negative for depression and suicidal ideas.          Objective:     Exam:  BP 94/62 (BP Location: Left arm, Patient Position: Sitting, BP Cuff Size: Adult)   Pulse 85   Temp 36.7 °C (98.1 °F) (Temporal)   Ht 1.575 m (5' 2\")   Wt 69.2 kg (152 lb 9.6 oz)   LMP 01/13/2018   SpO2 97%   BMI 27.91 kg/m²  Body mass index is 27.91 kg/m².    Physical Exam  Constitutional:       Appearance: Normal appearance.   HENT:      Head: Normocephalic and atraumatic.   Cardiovascular:      Rate and Rhythm: Normal rate and regular rhythm.      Pulses: Normal pulses.      Heart sounds: Normal heart sounds.   Pulmonary:      Effort: Pulmonary effort is normal.      Breath sounds: Normal breath sounds.   Skin:     General: Skin is warm and dry.      Capillary Refill: Capillary refill takes less than 2 seconds.   Neurological:      General: No focal deficit present.      Mental Status: She is alert and oriented to person, place, and time.       Assessment & Plan:     52 y.o. female with the following -     Problem List Items Addressed This Visit       Moderate episode of recurrent major depressive disorder (HCC)     -refer to counseling for depression symptoms.   -Patient is not interested in medication management, I agree with her that overall her symptoms are mild and that counseling would likely be very helpful.           Relevant Orders    Referral to Psychology    Thyromegaly     - Thyroid ultrasound.            Other Visit Diagnoses       Encounter for screening mammogram for malignant neoplasm of breast        Relevant Orders    MA-SCREENING MAMMO BILAT W/TOMOSYNTHESIS W/CAD            I spent a total of 20 minutes with record review, exam, communication with the patient, communication with other providers, and documentation of this " encounter.      Return in about 6 months (around 11/10/2023), or if symptoms worsen or fail to improve.    I have placed the below orders and discussed them with an approved delegating provider. The MA is performing the below orders under the direction of Dr. Ramirez.     Please note that this dictation was created using voice recognition software. I have made every reasonable attempt to correct obvious errors, but I expect that there are errors of grammar and possibly content that I did not discover before finalizing the note.

## 2023-06-26 ENCOUNTER — TELEPHONE (OUTPATIENT)
Dept: MEDICAL GROUP | Facility: PHYSICIAN GROUP | Age: 53
End: 2023-06-26
Payer: COMMERCIAL

## 2023-06-26 NOTE — TELEPHONE ENCOUNTER
Patient would need an appointment because we did not discuss a specific depression medication at her last appointment.  At that time she declined medication.

## 2023-06-26 NOTE — TELEPHONE ENCOUNTER
Phone Number Called: [phone] (home)        Call outcome: Spoke to patient regarding message below.    Message: flakita called in to see if she can get the depression medication that was decused at last appt

## 2023-07-10 ENCOUNTER — OFFICE VISIT (OUTPATIENT)
Dept: MEDICAL GROUP | Facility: PHYSICIAN GROUP | Age: 53
End: 2023-07-10
Payer: COMMERCIAL

## 2023-07-10 VITALS
BODY MASS INDEX: 28.71 KG/M2 | SYSTOLIC BLOOD PRESSURE: 94 MMHG | WEIGHT: 156 LBS | HEART RATE: 85 BPM | TEMPERATURE: 97.5 F | OXYGEN SATURATION: 99 % | DIASTOLIC BLOOD PRESSURE: 66 MMHG | HEIGHT: 62 IN

## 2023-07-10 DIAGNOSIS — M25.562 ACUTE BILATERAL KNEE PAIN: ICD-10-CM

## 2023-07-10 DIAGNOSIS — M25.561 ACUTE BILATERAL KNEE PAIN: ICD-10-CM

## 2023-07-10 DIAGNOSIS — F32.1 CURRENT MODERATE EPISODE OF MAJOR DEPRESSIVE DISORDER WITHOUT PRIOR EPISODE (HCC): ICD-10-CM

## 2023-07-10 PROBLEM — F33.1 MODERATE EPISODE OF RECURRENT MAJOR DEPRESSIVE DISORDER (HCC): Status: RESOLVED | Noted: 2023-05-10 | Resolved: 2023-07-10

## 2023-07-10 PROBLEM — F32.9 MAJOR DEPRESSIVE DISORDER: Status: ACTIVE | Noted: 2023-07-10

## 2023-07-10 PROCEDURE — 3078F DIAST BP <80 MM HG: CPT | Performed by: NURSE PRACTITIONER

## 2023-07-10 PROCEDURE — 99214 OFFICE O/P EST MOD 30 MIN: CPT | Performed by: NURSE PRACTITIONER

## 2023-07-10 PROCEDURE — 3074F SYST BP LT 130 MM HG: CPT | Performed by: NURSE PRACTITIONER

## 2023-07-10 RX ORDER — FLUOXETINE HYDROCHLORIDE 20 MG/1
20 CAPSULE ORAL DAILY
Qty: 30 CAPSULE | Refills: 0 | Status: SHIPPED
Start: 2023-07-10 | End: 2023-08-07

## 2023-07-10 RX ORDER — FLUOXETINE HYDROCHLORIDE 20 MG/1
20 CAPSULE ORAL DAILY
Qty: 30 CAPSULE | Refills: 0 | Status: SHIPPED
Start: 2023-07-10 | End: 2023-07-10

## 2023-07-10 RX ORDER — FLUOXETINE HYDROCHLORIDE 20 MG/1
20 CAPSULE ORAL DAILY
Qty: 30 CAPSULE | Refills: 0 | Status: SHIPPED | OUTPATIENT
Start: 2023-07-10 | End: 2023-07-10

## 2023-07-10 ASSESSMENT — ENCOUNTER SYMPTOMS
ABDOMINAL PAIN: 0
DIZZINESS: 0
HEMOPTYSIS: 0
CHILLS: 0
HEARTBURN: 0
COUGH: 0
PALPITATIONS: 0
FEVER: 0
DEPRESSION: 0
WHEEZING: 0
HEADACHES: 0

## 2023-07-10 ASSESSMENT — PATIENT HEALTH QUESTIONNAIRE - PHQ9
SUM OF ALL RESPONSES TO PHQ QUESTIONS 1-9: 17
5. POOR APPETITE OR OVEREATING: 2 - MORE THAN HALF THE DAYS
CLINICAL INTERPRETATION OF PHQ2 SCORE: 4

## 2023-07-10 ASSESSMENT — FIBROSIS 4 INDEX: FIB4 SCORE: 0.87

## 2023-07-10 NOTE — PROGRESS NOTES
Subjective:     Chief Complaint   Patient presents with    Depression    Letter for School/Work     Excuse for being seen today missed work       HPI:   Palak presents today with     Problem   Major Depressive Disorder    Patient was seen on 5/10/23 with complaints of depression. She declined medication at that time as she wanted to try seeing a therapist first. She has seen her therapist 4 times and is now here to talk about starting an antidepressant medication.  She says she is feeling anxious, which is affecting her sleep. She has no energy during the day.      Depression Screening    Little interest or pleasure in doing things?  2 - more than half the days   Feeling down, depressed , or hopeless? 2 - more than half the days   Trouble falling or staying asleep, or sleeping too much?  3 - nearly every day   Feeling tired or having little energy?  3 - nearly every day   Poor appetite or overeating?  2 - more than half the days   Feeling bad about yourself - or that you are a failure or have let yourself or your family down? 2 - more than half the days   Trouble concentrating on things, such as reading the newspaper or watching television? 3 - nearly every day   Moving or speaking so slowly that other people could have noticed.  Or the opposite - being so fidgety or restless that you have been moving around a lot more than usual?  0 - not at all   Thoughts that you would be better off dead, or of hurting yourself?  0 - not at all   Patient Health Questionnaire Score: 17       If depressive symptoms identified deferred to follow up visit unless specifically addressed in assesment and plan.    Interpretation of PHQ-9 Total Score   Score Severity   1-4 No Depression   5-9 Mild Depression   10-14 Moderate Depression   15-19 Moderately Severe Depression   20-27 Severe Depression         Acute Bilateral Knee Pain    Provider.  Started ago.  States that she does a lot of walking up and down stairs for work and states that  she has started to notice more pain with walking up and down the stairs bilaterally.  Pain will continue for several minutes after she has completed the flight of stairs.  States that it is aching in nature.  States that at its worst it will be 8 out of 10.  Denies any swelling, bruising, specific injury to either knee no falls or other trauma.  States that she does not notice any other pain in her knees aside from when she is doing stairs. States nothing specific improves the discomfort.     Moderate Episode of Recurrent Major Depressive Disorder (Hcc) (Resolved)    Depression Screening    Little interest or pleasure in doing things?  0 - not at all   Feeling down, depressed , or hopeless? 2 - more than half the days   Trouble falling or staying asleep, or sleeping too much?  1 - several days   Feeling tired or having little energy?  2 - more than half the days   Poor appetite or overeating?  1 - several days   Feeling bad about yourself - or that you are a failure or have let yourself or your family down? 1 - several days   Trouble concentrating on things, such as reading the newspaper or watching television? 0 - not at all   Moving or speaking so slowly that other people could have noticed.  Or the opposite - being so fidgety or restless that you have been moving around a lot more than usual?  0 - not at all   Thoughts that you would be better off dead, or of hurting yourself?  0 - not at all   Patient Health Questionnaire Score: 7       If depressive symptoms identified deferred to follow up visit unless specifically addressed in assesment and plan.    Interpretation of PHQ-9 Total Score   Score Severity   1-4 No Depression   5-9 Mild Depression   10-14 Moderate Depression   15-19 Moderately Severe Depression   20-27 Severe Depression           Current Outpatient Medications Ordered in Epic   Medication Sig Dispense Refill    FLUoxetine (PROZAC) 20 MG Cap Take 1 Capsule by mouth every day. 30 Capsule 0     No  "current [facility]-ordered facility-administered medications on file.       Health Maintenance: Completed    Review of Systems   Constitutional:  Negative for chills, fever and malaise/fatigue.   Respiratory:  Negative for cough, hemoptysis and wheezing.    Cardiovascular:  Negative for chest pain, palpitations and leg swelling.   Gastrointestinal:  Negative for abdominal pain and heartburn.   Neurological:  Negative for dizziness and headaches.   Psychiatric/Behavioral:  Negative for depression and suicidal ideas.          Objective:     Exam:  BP 94/66 (BP Location: Left arm, Patient Position: Sitting, BP Cuff Size: Adult)   Pulse 85   Temp 36.4 °C (97.5 °F) (Temporal)   Ht 1.575 m (5' 2\")   Wt 70.8 kg (156 lb)   LMP 01/13/2018   SpO2 99%   BMI 28.53 kg/m²  Body mass index is 28.53 kg/m².    Physical Exam  Constitutional:       Appearance: Normal appearance.   HENT:      Head: Normocephalic and atraumatic.   Eyes:      Pupils: Pupils are equal, round, and reactive to light.   Cardiovascular:      Rate and Rhythm: Normal rate and regular rhythm.      Pulses: Normal pulses.      Heart sounds: Normal heart sounds.   Pulmonary:      Effort: Pulmonary effort is normal.      Breath sounds: Normal breath sounds.   Skin:     General: Skin is warm and dry.      Capillary Refill: Capillary refill takes less than 2 seconds.   Neurological:      General: No focal deficit present.      Mental Status: She is alert and oriented to person, place, and time.       Assessment & Plan:     52 y.o. female with the following -     Problem List Items Addressed This Visit       Acute bilateral knee pain     -referral to PT         Relevant Orders    Referral to Physical Therapy    Major depressive disorder     - Fluoxetine, 20 mg, daily  - FU in one month to check on effectiveness and side effects         Relevant Medications    FLUoxetine (PROZAC) 20 MG Cap    Other Relevant Orders    Patient has been identified as having a positive " depression screening. Appropriate orders and counseling have been given. (Completed)         Return in about 1 month (around 8/10/2023), or if symptoms worsen or fail to improve.    I have placed the below orders and discussed them with an approved delegating provider. The MA is performing the below orders under the direction of Dr. Ramirez.     Please note that this dictation was created using voice recognition software. I have made every reasonable attempt to correct obvious errors, but I expect that there are errors of grammar and possibly content that I did not discover before finalizing the note.

## 2023-07-10 NOTE — LETTER
July 10, 2023        Palak Brunner  [address] [address]        To Whom it May Concern:    Please excuse Palak related to medical appointment on 7/10/2023.    If you have any questions or concerns, please don't hesitate to call.        Sincerely,        ROCK Rossi.    Electronically Signed

## 2023-08-05 DIAGNOSIS — F32.1 CURRENT MODERATE EPISODE OF MAJOR DEPRESSIVE DISORDER WITHOUT PRIOR EPISODE (HCC): ICD-10-CM

## 2023-08-07 RX ORDER — FLUOXETINE HYDROCHLORIDE 20 MG/1
20 CAPSULE ORAL DAILY
Qty: 30 CAPSULE | Refills: 0 | Status: SHIPPED | OUTPATIENT
Start: 2023-08-07 | End: 2023-09-05

## 2023-08-29 ENCOUNTER — APPOINTMENT (OUTPATIENT)
Dept: MEDICAL GROUP | Facility: PHYSICIAN GROUP | Age: 53
End: 2023-08-29
Payer: COMMERCIAL

## 2023-09-03 DIAGNOSIS — F32.1 CURRENT MODERATE EPISODE OF MAJOR DEPRESSIVE DISORDER WITHOUT PRIOR EPISODE (HCC): ICD-10-CM

## 2023-09-05 RX ORDER — FLUOXETINE HYDROCHLORIDE 20 MG/1
20 CAPSULE ORAL DAILY
Qty: 30 CAPSULE | Refills: 0 | Status: SHIPPED | OUTPATIENT
Start: 2023-09-05 | End: 2023-10-02

## 2023-09-22 ENCOUNTER — HOSPITAL ENCOUNTER (OUTPATIENT)
Dept: RADIOLOGY | Facility: MEDICAL CENTER | Age: 53
End: 2023-09-22
Attending: NURSE PRACTITIONER
Payer: COMMERCIAL

## 2023-09-22 DIAGNOSIS — Z12.31 ENCOUNTER FOR SCREENING MAMMOGRAM FOR MALIGNANT NEOPLASM OF BREAST: ICD-10-CM

## 2023-09-22 PROCEDURE — 77063 BREAST TOMOSYNTHESIS BI: CPT

## 2023-10-02 DIAGNOSIS — F32.1 CURRENT MODERATE EPISODE OF MAJOR DEPRESSIVE DISORDER WITHOUT PRIOR EPISODE (HCC): ICD-10-CM

## 2023-10-02 RX ORDER — FLUOXETINE HYDROCHLORIDE 20 MG/1
20 CAPSULE ORAL DAILY
Qty: 30 CAPSULE | Refills: 0 | Status: SHIPPED | OUTPATIENT
Start: 2023-10-02 | End: 2023-10-31 | Stop reason: SDUPTHER

## 2023-10-24 ENCOUNTER — HOSPITAL ENCOUNTER (OUTPATIENT)
Facility: MEDICAL CENTER | Age: 53
End: 2023-10-24
Attending: NURSE PRACTITIONER
Payer: COMMERCIAL

## 2023-10-24 ENCOUNTER — OFFICE VISIT (OUTPATIENT)
Dept: MEDICAL GROUP | Facility: PHYSICIAN GROUP | Age: 53
End: 2023-10-24
Payer: COMMERCIAL

## 2023-10-24 VITALS
BODY MASS INDEX: 28.71 KG/M2 | DIASTOLIC BLOOD PRESSURE: 68 MMHG | HEIGHT: 62 IN | TEMPERATURE: 97.8 F | OXYGEN SATURATION: 96 % | WEIGHT: 156 LBS | HEART RATE: 89 BPM | SYSTOLIC BLOOD PRESSURE: 100 MMHG

## 2023-10-24 DIAGNOSIS — Z11.59 NEED FOR HEPATITIS C SCREENING TEST: ICD-10-CM

## 2023-10-24 DIAGNOSIS — Z11.51 SCREENING FOR HUMAN PAPILLOMAVIRUS: ICD-10-CM

## 2023-10-24 DIAGNOSIS — N39.3 STRESS INCONTINENCE: ICD-10-CM

## 2023-10-24 DIAGNOSIS — Z11.4 ENCOUNTER FOR SCREENING FOR HIV: ICD-10-CM

## 2023-10-24 DIAGNOSIS — Z00.00 WELLNESS EXAMINATION: ICD-10-CM

## 2023-10-24 PROCEDURE — 87624 HPV HI-RISK TYP POOLED RSLT: CPT

## 2023-10-24 PROCEDURE — 3078F DIAST BP <80 MM HG: CPT | Performed by: NURSE PRACTITIONER

## 2023-10-24 PROCEDURE — 99396 PREV VISIT EST AGE 40-64: CPT | Performed by: NURSE PRACTITIONER

## 2023-10-24 PROCEDURE — 88175 CYTOPATH C/V AUTO FLUID REDO: CPT

## 2023-10-24 PROCEDURE — 3074F SYST BP LT 130 MM HG: CPT | Performed by: NURSE PRACTITIONER

## 2023-10-24 ASSESSMENT — FIBROSIS 4 INDEX: FIB4 SCORE: 0.88

## 2023-10-24 NOTE — PROGRESS NOTES
Subjective:     CC:   Chief Complaint   Patient presents with    Annual Exam       HPI:   Palak Brunner is a 53 y.o. female who presents for annual exam. She is feeling well and denies any complaints.    Ob-Gyn/ History:    Patient has GYN provider: no  /Para:    Last Pap Smear:  . no history of abnormal pap smears.  Gyn Surgery:  none.  Current Contraceptive Method:  is currently sexually active.  Last menstrual period:  2018.    No significant bloating/fluid retention, pelvic pain, or dyspareunia. No vaginal discharge  Post-menopausal bleeding: none  Urinary incontinence: yes, stress      Health Maintenance  Advanced directive: not on file   Osteoporosis Screen/ DEXA: due at 64   Cholesterol Screening: ordered   Diabetes Screening: ordered     Anticipatory Guidance  Diet: healthy overall   Exercise: at times.   Substance Abuse:no concern  Safe in relationship.   Seat belts, bike helmet, gun safety discussed.  Sun protection used.    Cancer screening  Colorectal Cancer Screening: up to date    Cervical Cancer Screening: today   Breast Cancer Screening: up to date     Infectious disease screening/Immunizations  --STI Screening: not needed    --Practices safe sex.  --HIV Screening: ordered   --Hepatitis C Screening: ordered   --Immunizations:      She  has a past medical history of Bilateral breast cysts (2011), Gastritis (2014), and Urinary incontinence ().    She has no past medical history of Breast cancer (HCC).  She  has a past surgical history that includes cholecystectomy; primary c section; US-CYST ASPIRATION-BREAST INITIAL; hemorrhoidectomy (2017); lesion excision general (Left, 2017); and laser ablation (Left, 06/10/2022).    Family History   Problem Relation Age of Onset    Diabetes Neg Hx     Heart Disease Neg Hx     Cancer Neg Hx     Stroke Neg Hx        Social History     Socioeconomic History    Marital status:      Spouse name: Not on  file    Number of children: Not on file    Years of education: Not on file    Highest education level: Not on file   Occupational History    Not on file   Tobacco Use    Smoking status: Never    Smokeless tobacco: Never    Tobacco comments:     aviod all tobacco products   Vaping Use    Vaping Use: Never used   Substance and Sexual Activity    Alcohol use: Yes     Comment: occasionally    Drug use: No    Sexual activity: Yes     Partners: Male     Birth control/protection: Pill   Other Topics Concern    Not on file   Social History Narrative    Not on file     Social Determinants of Health     Financial Resource Strain: Not on file   Food Insecurity: Not on file   Transportation Needs: Not on file   Physical Activity: Not on file   Stress: Not on file   Social Connections: Not on file   Intimate Partner Violence: Not on file   Housing Stability: Not on file       Patient Active Problem List    Diagnosis Date Noted    Major depressive disorder 07/10/2023    Acute bilateral knee pain 07/10/2023    Thyromegaly 03/07/2023    Shoulder pain, right 03/07/2023    Dry eyes 03/07/2023    Menopause 06/15/2021    Joint pain 06/15/2021    Keloid scar 11/30/2018    Seborrheic keratoses 06/12/2018    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     No Known Allergies    Review of Systems   Constitutional: Negative for fever, chills and malaise/fatigue.   HENT: Negative for congestion.    Eyes: Negative for pain.    Respiratory: Negative for cough and shortness of breath.  Cardiovascular: Negative for leg swelling.   Gastrointestinal: Negative for nausea, vomiting, abdominal pain and diarrhea.  "  Genitourinary: Negative for dysuria and hematuria.   Skin: Negative for rash.   Neurological: Negative for dizziness, focal weakness and headaches.   Endo/Heme/Allergies: Does not bleed easily.   Psychiatric/Behavioral: Negative for depression.  The patient is not nervous/anxious.      Objective:     /68 (BP Location: Left arm, Patient Position: Sitting, BP Cuff Size: Adult long)   Pulse 89   Temp 36.6 °C (97.8 °F) (Temporal)   Ht 1.575 m (5' 2\")   Wt 70.8 kg (156 lb)   LMP 01/13/2018   SpO2 96%   BMI 28.53 kg/m²   Body mass index is 28.53 kg/m².  Wt Readings from Last 4 Encounters:   10/24/23 70.8 kg (156 lb)   07/10/23 70.8 kg (156 lb)   05/10/23 69.2 kg (152 lb 9.6 oz)   03/07/23 67.1 kg (148 lb)       Physical Exam:  Constitutional: Well-developed and well-nourished. Not diaphoretic. No distress.   Skin: Skin is warm and dry. No rash noted.  Head: Atraumatic without lesions.  Eyes: Conjunctivae and extraocular motions are normal. Pupils are equal, round, and reactive to light. No scleral icterus.   Ears:  External ears unremarkable. Tympanic membranes clear and intact.  Nose: Nares patent. Septum midline. Turbinates without erythema nor edema. No discharge.   Mouth/Throat: Dentition is WNL. Tongue normal. Oropharynx is clear and moist. Posterior pharynx without erythema or exudates.  Neck: Supple, trachea midline. Normal range of motion. No thyromegaly present. No lymphadenopathy--cervical or supraclavicular.  Cardiovascular: Regular rate and rhythm, S1 and S2 without murmur, rubs, or gallops.  Lungs: Normal inspiratory effort, CTA bilaterally, no wheezes/rhonchi/rales  Abdomen: Soft, non tender, and without distention. Active bowel sounds in all four quadrants. No rebound, guarding, masses or HSM.  :Perineum and external genitalia normal without rash. Vagina with normal and physiologic discharge. Cervix without visible lesions or discharge. Bimanual exam without adnexal masses or cervical " motion tenderness.  Extremities: No cyanosis, clubbing, erythema, nor edema. Distal pulses intact and symmetric.   Musculoskeletal: All major joints AROM full in all directions without pain.  Neurological: Alert and oriented x 3. DTRs 2+/3 and symmetric. No cranial nerve deficit. 5/5 myotomes. Sensation intact.   Psychiatric:  Behavior, mood, and affect are appropriate.    A chaperone was offered to the patient during today's exam. Chaperone name: Yassine was present.    Assessment and Plan:     1. Wellness examination  Comp Metabolic Panel    Lipid Profile      2. Encounter for screening for HIV  HIV AG/AB COMBO ASSAY SCREENING      3. Need for hepatitis C screening test  HEP C VIRUS ANTIBODY      4. Screening for human papillomavirus  THINPREP PAP WITH HPV          HCM:     Labs per orders  Immunizations per orders  Patient counseled about skin care, diet, supplements, prenatal vitamins, safe sex and exercise.      Follow-up: Return in about 1 month (around 11/24/2023), or if symptoms worsen or fail to improve, for menopause.

## 2023-10-27 LAB
CYTOLOGIST CVX/VAG CYTO: NORMAL
CYTOLOGY CVX/VAG DOC CYTO: NORMAL
CYTOLOGY CVX/VAG DOC THIN PREP: NORMAL
HPV I/H RISK 4 DNA CVX QL PROBE+SIG AMP: NEGATIVE
NOTE NL11727A: NORMAL
OTHER STN SPEC: NORMAL
STAT OF ADQ CVX/VAG CYTO-IMP: NORMAL

## 2023-10-31 DIAGNOSIS — F32.1 CURRENT MODERATE EPISODE OF MAJOR DEPRESSIVE DISORDER WITHOUT PRIOR EPISODE (HCC): ICD-10-CM

## 2023-10-31 RX ORDER — FLUOXETINE HYDROCHLORIDE 20 MG/1
20 CAPSULE ORAL DAILY
Qty: 30 CAPSULE | Refills: 0 | Status: ON HOLD | OUTPATIENT
Start: 2023-10-31 | End: 2024-01-12

## 2023-10-31 NOTE — TELEPHONE ENCOUNTER
Does she need a physical, written prescription? Or does she simply need a paper stating that Fluoxetine is an active medication for her?

## 2023-10-31 NOTE — TELEPHONE ENCOUNTER
Received request via: Patient    Was the patient seen in the last year in this department? Yes    Does the patient have an active prescription (recently filled or refills available) for medication(s) requested?  PT CAME IN CLINIC STATING SHE NEEDS A PAPER COPY OF HER PRESCRIPTION FOR WHEN SHE TRAVELS PLEASE ADVISE.    Does the patient have assisted Plus and need 100 day supply (blood pressure, diabetes and cholesterol meds only)? Patient does not have SCP

## 2023-10-31 NOTE — TELEPHONE ENCOUNTER
Pt is requesting both a paper prescription an a letter stating she takes this medication for her travels.

## 2023-12-14 ENCOUNTER — OFFICE VISIT (OUTPATIENT)
Dept: UROLOGY | Facility: MEDICAL CENTER | Age: 53
End: 2023-12-14
Payer: COMMERCIAL

## 2023-12-14 VITALS
WEIGHT: 159.6 LBS | HEART RATE: 98 BPM | OXYGEN SATURATION: 98 % | SYSTOLIC BLOOD PRESSURE: 103 MMHG | DIASTOLIC BLOOD PRESSURE: 68 MMHG | BODY MASS INDEX: 27.25 KG/M2 | HEIGHT: 64 IN

## 2023-12-14 DIAGNOSIS — N39.41 URGE INCONTINENCE: ICD-10-CM

## 2023-12-14 LAB
POC POST-VOID: 0 ML
POC PRE-VOID: 198 ML

## 2023-12-14 PROCEDURE — 3074F SYST BP LT 130 MM HG: CPT

## 2023-12-14 PROCEDURE — 3078F DIAST BP <80 MM HG: CPT

## 2023-12-14 PROCEDURE — 99204 OFFICE O/P NEW MOD 45 MIN: CPT

## 2023-12-14 PROCEDURE — 51798 US URINE CAPACITY MEASURE: CPT

## 2023-12-14 RX ORDER — ESTRADIOL 0.1 MG/G
CREAM VAGINAL
Qty: 42.5 G | Refills: 3 | Status: SHIPPED | OUTPATIENT
Start: 2023-12-14

## 2023-12-14 ASSESSMENT — FIBROSIS 4 INDEX: FIB4 SCORE: 0.88

## 2023-12-14 NOTE — PROGRESS NOTES
uroloSubjective  Palak Brunner is a 53 y.o. female who presents today for evaluation of mixed incontinence.  Patient is being seen in the clinic today related to urge incontinence, and stress incontinence.  Patient reports that she has leakage with coughing, sneezing, laughing, exercising and is experiencing leakage with urgency.  Patient reports that she urinates every hour, that she has to get up every 2 hours to void at night, she reports that she does have to rush to urinate most of the time, and she does occasionally leak urine before getting to the toilet.    Patient denies pain with urination, patient denies burning with urination, patient reports some vaginal dryness and itching and is unsure if this is related to her constantly wearing pads.  Patient denies postcoital pain. Patient reports no fever chills and back pain.    Patient is interested in surgical interventions at this time.  Patient has tried pelvic floor physical therapy for 3 to 4 months in the past from [facility] and reports that this has not resolved her issues.    Family History   Problem Relation Age of Onset    Diabetes Neg Hx     Heart Disease Neg Hx     Cancer Neg Hx     Stroke Neg Hx        Social History     Socioeconomic History    Marital status:      Spouse name: Not on file    Number of children: Not on file    Years of education: Not on file    Highest education level: Not on file   Occupational History    Not on file   Tobacco Use    Smoking status: Never    Smokeless tobacco: Never    Tobacco comments:     aviod all tobacco products   Vaping Use    Vaping Use: Never used   Substance and Sexual Activity    Alcohol use: Not Currently     Comment: occasionally    Drug use: No    Sexual activity: Yes     Partners: Male     Birth control/protection: Pill   Other Topics Concern    Not on file   Social History Narrative    Not on file     Social Determinants of Health     Financial Resource Strain: Not on  "file   Food Insecurity: Not on file   Transportation Needs: Not on file   Physical Activity: Not on file   Stress: Not on file   Social Connections: Not on file   Intimate Partner Violence: Not on file   Housing Stability: Not on file       Past Surgical History:   Procedure Laterality Date    LASER ABLATION Left 06/10/2022    left greater saphenous vein    LESION EXCISION GENERAL Left 12/18/2017    Procedure: LESION EXCISION GENERAL - FOR RE-EXCISION UPPER ARM 2CM NEOPLASTIC SKIN LESION;  Surgeon: Wesley Corrigan M.D.;  Location: SURGERY SAME DAY [facility] ORS;  Service: General    HEMORRHOIDECTOMY  06/22/2017    Procedure: HEMORRHOIDECTOMY FOR EXCISION EXTERNAL HEMORRHOID/SKIN TAG;  Surgeon: Wesley Corrigan M.D.;  Location: SURGERY SAME DAY [facility] ORS;  Service:     CHOLECYSTECTOMY      PRIMARY C SECTION      US-CYST ASPIRATION-BREAST INITIAL         Past Medical History:   Diagnosis Date    Bilateral breast cysts 6/1/2011    Present for 10-12 years    Gastritis 11/20/2014    Urinary incontinence 6-2017       Current Outpatient Medications   Medication Sig Dispense Refill    FLUoxetine (PROZAC) 20 MG Cap Take 1 Capsule by mouth every day. 30 Capsule 0     No current facility-administered medications for this visit.       No Known Allergies    Objective  /68 (BP Location: Left arm, Patient Position: Sitting, BP Cuff Size: Adult)   Pulse 98   Ht 1.626 m (5' 4\")   Wt 72.4 kg (159 lb 9.6 oz)   SpO2 98%   Physical Exam  Constitutional:       Appearance: Normal appearance.   HENT:      Head: Normocephalic and atraumatic.   Eyes:      Extraocular Movements: Extraocular movements intact.   Pulmonary:      Effort: Pulmonary effort is normal.      Breath sounds: Normal breath sounds.   Genitourinary:     Comments: No urinary symptoms at this time      Post 0  Skin:     General: Skin is warm and dry.   Neurological:      Mental Status: She is alert.         Labs:   none    Imaging:   None " "    Assessment    I have discussed this case with Dr Nascimento.    We reviewed the treatment options for stress incontinence including Bulkamid or urethral sling.  Patient at this time is willing to try vaginal estrogen and potentially another medication like trospium or myrbetriq to help with her OAB/urge.     She is ready to proceed with surgery for management of her stress incontinence and will discuss further management of stress incontinence as she wishes to improve her quality of life at this time and is \"sick of using pads\"    I have scheduled for her to have bulkamid injections with Dr Nascimento as she agrees this is what she would prefer at this time and is willing to try medical management for urge incontinence          Plan    Problem List Items Addressed This Visit       Urge incontinence     Vaginal Estrogen Cream Use     Proper Application Technique:     1. Wash Hands: Start by thoroughly washing your hands with soap and water to ensure cleanliness during the application process.     2. Read Instructions: Carefully read the instructions provided with the specific vaginal estrogen product you are using. Different formulations may have slightly different application methods and dosing schedules.     3. Choose a Comfortable Position: Find a comfortable position for application. Many individuals prefer lying on their back with their knees bent, but you can choose a position that works best for you.     4. Application of Cream or Tablet:     Cream: If using a vaginal estrogen cream, typically, a small amount is applied inside the vagina using an applicator provided with the product. You can also apply a small pea-sized amount to the tip of your finger. Rub the cream in after placement. It is important to get the cream inside the vaginal canal (where a tampon would be placed).    Tablet: For vaginal estrogen tablets, use the applicator to insert the tablet deeply into the vagina as directed by your healthcare " provider.     5. Insertion Technique:     If you are using an applicator, gently insert it into the vagina as far as is comfortable, while following the directions in the product's package insert.   If using a tablet without an applicator, insert it as deep into the vagina as possible using your finger.     6. Disposal: Dispose of the applicator or packaging as per the 's instructions.     7. Wash Hands Again: After inserting the vaginal estrogen, wash your hands thoroughly once more.     8. Follow Dosage Instructions: Use the medication as prescribed by your healthcare provider. Do not use more or less than the recommended dose, and adhere to the recommended schedule.     9. Stay Upright: Remain in an upright position for a short time after application to allow the medication to disperse and absorb properly.     10. Be Consistent: For best results, use vaginal estrogen consistently as directed by your healthcare provider. It may take some time before you notice significant improvement in your symptoms.     Risks and Benefits:     Benefits:     Symptom Relief: Vaginal estrogen can effectively relieve a range of urogenital symptoms associated with menopause, such as vaginal dryness, itching, burning, and pain during intercourse.   Improved Vaginal Health: It can help improve vaginal tissue health, including increased vaginal moisture and elasticity.   Reduced Urinary Symptoms: Vaginal estrogen can also alleviate certain urinary symptoms like frequent urination and urinary urgency in some individuals.   Reduced Urinary Tract Infections: Vaginal estrogen restores healthy vaginal bacteria that are protective against urinary tract infections.     Risks:     Systemic Absorption: While the risk is low, some estrogen from vaginal applications can be absorbed into the bloodstream. This may carry a small risk of systemic side effects like breast tenderness, headache, or changes in mood. However, the systemic  absorption is much lower with vaginal estrogen than with oral hormone therapy.   Breast Cancer Risk: There is long term safety data that supports the use of vaginal estrogen cream in patients with a family history of breast cancer or a personal history of treated breast cancer that is in remission. It does not increase the risk of recurrence in patients that have previously been treated for breast cancer.   Vaginal Irritation: Some individuals may experience vaginal irritation, itching, or discharge as a side effect of vaginal estrogen.

## 2023-12-14 NOTE — ASSESSMENT & PLAN NOTE
Vaginal Estrogen Cream Use     Proper Application Technique:     1. Wash Hands: Start by thoroughly washing your hands with soap and water to ensure cleanliness during the application process.     2. Read Instructions: Carefully read the instructions provided with the specific vaginal estrogen product you are using. Different formulations may have slightly different application methods and dosing schedules.     3. Choose a Comfortable Position: Find a comfortable position for application. Many individuals prefer lying on their back with their knees bent, but you can choose a position that works best for you.     4. Application of Cream or Tablet:     Cream: If using a vaginal estrogen cream, typically, a small amount is applied inside the vagina using an applicator provided with the product. You can also apply a small pea-sized amount to the tip of your finger. Rub the cream in after placement. It is important to get the cream inside the vaginal canal (where a tampon would be placed).    Tablet: For vaginal estrogen tablets, use the applicator to insert the tablet deeply into the vagina as directed by your healthcare provider.     5. Insertion Technique:     If you are using an applicator, gently insert it into the vagina as far as is comfortable, while following the directions in the product's package insert.   If using a tablet without an applicator, insert it as deep into the vagina as possible using your finger.     6. Disposal: Dispose of the applicator or packaging as per the 's instructions.     7. Wash Hands Again: After inserting the vaginal estrogen, wash your hands thoroughly once more.     8. Follow Dosage Instructions: Use the medication as prescribed by your healthcare provider. Do not use more or less than the recommended dose, and adhere to the recommended schedule.     9. Stay Upright: Remain in an upright position for a short time after application to allow the medication to disperse  and absorb properly.     10. Be Consistent: For best results, use vaginal estrogen consistently as directed by your healthcare provider. It may take some time before you notice significant improvement in your symptoms.     Risks and Benefits:     Benefits:     Symptom Relief: Vaginal estrogen can effectively relieve a range of urogenital symptoms associated with menopause, such as vaginal dryness, itching, burning, and pain during intercourse.   Improved Vaginal Health: It can help improve vaginal tissue health, including increased vaginal moisture and elasticity.   Reduced Urinary Symptoms: Vaginal estrogen can also alleviate certain urinary symptoms like frequent urination and urinary urgency in some individuals.   Reduced Urinary Tract Infections: Vaginal estrogen restores healthy vaginal bacteria that are protective against urinary tract infections.     Risks:     Systemic Absorption: While the risk is low, some estrogen from vaginal applications can be absorbed into the bloodstream. This may carry a small risk of systemic side effects like breast tenderness, headache, or changes in mood. However, the systemic absorption is much lower with vaginal estrogen than with oral hormone therapy.   Breast Cancer Risk: There is long term safety data that supports the use of vaginal estrogen cream in patients with a family history of breast cancer or a personal history of treated breast cancer that is in remission. It does not increase the risk of recurrence in patients that have previously been treated for breast cancer.   Vaginal Irritation: Some individuals may experience vaginal irritation, itching, or discharge as a side effect of vaginal estrogen.

## 2023-12-14 NOTE — H&P (VIEW-ONLY)
uroloSubjective  Palak Brunner is a 53 y.o. female who presents today for evaluation of mixed incontinence.  Patient is being seen in the clinic today related to urge incontinence, and stress incontinence.  Patient reports that she has leakage with coughing, sneezing, laughing, exercising and is experiencing leakage with urgency.  Patient reports that she urinates every hour, that she has to get up every 2 hours to void at night, she reports that she does have to rush to urinate most of the time, and she does occasionally leak urine before getting to the toilet.    Patient denies pain with urination, patient denies burning with urination, patient reports some vaginal dryness and itching and is unsure if this is related to her constantly wearing pads.  Patient denies postcoital pain. Patient reports no fever chills and back pain.    Patient is interested in surgical interventions at this time.  Patient has tried pelvic floor physical therapy for 3 to 4 months in the past from [facility] and reports that this has not resolved her issues.    Family History   Problem Relation Age of Onset    Diabetes Neg Hx     Heart Disease Neg Hx     Cancer Neg Hx     Stroke Neg Hx        Social History     Socioeconomic History    Marital status:      Spouse name: Not on file    Number of children: Not on file    Years of education: Not on file    Highest education level: Not on file   Occupational History    Not on file   Tobacco Use    Smoking status: Never    Smokeless tobacco: Never    Tobacco comments:     aviod all tobacco products   Vaping Use    Vaping Use: Never used   Substance and Sexual Activity    Alcohol use: Not Currently     Comment: occasionally    Drug use: No    Sexual activity: Yes     Partners: Male     Birth control/protection: Pill   Other Topics Concern    Not on file   Social History Narrative    Not on file     Social Determinants of Health     Financial Resource Strain: Not on  "file   Food Insecurity: Not on file   Transportation Needs: Not on file   Physical Activity: Not on file   Stress: Not on file   Social Connections: Not on file   Intimate Partner Violence: Not on file   Housing Stability: Not on file       Past Surgical History:   Procedure Laterality Date    LASER ABLATION Left 06/10/2022    left greater saphenous vein    LESION EXCISION GENERAL Left 12/18/2017    Procedure: LESION EXCISION GENERAL - FOR RE-EXCISION UPPER ARM 2CM NEOPLASTIC SKIN LESION;  Surgeon: Wesley Corrigan M.D.;  Location: SURGERY SAME DAY [facility] ORS;  Service: General    HEMORRHOIDECTOMY  06/22/2017    Procedure: HEMORRHOIDECTOMY FOR EXCISION EXTERNAL HEMORRHOID/SKIN TAG;  Surgeon: Wesley Corrigan M.D.;  Location: SURGERY SAME DAY [facility] ORS;  Service:     CHOLECYSTECTOMY      PRIMARY C SECTION      US-CYST ASPIRATION-BREAST INITIAL         Past Medical History:   Diagnosis Date    Bilateral breast cysts 6/1/2011    Present for 10-12 years    Gastritis 11/20/2014    Urinary incontinence 6-2017       Current Outpatient Medications   Medication Sig Dispense Refill    FLUoxetine (PROZAC) 20 MG Cap Take 1 Capsule by mouth every day. 30 Capsule 0     No current facility-administered medications for this visit.       No Known Allergies    Objective  /68 (BP Location: Left arm, Patient Position: Sitting, BP Cuff Size: Adult)   Pulse 98   Ht 1.626 m (5' 4\")   Wt 72.4 kg (159 lb 9.6 oz)   SpO2 98%   Physical Exam  Constitutional:       Appearance: Normal appearance.   HENT:      Head: Normocephalic and atraumatic.   Eyes:      Extraocular Movements: Extraocular movements intact.   Pulmonary:      Effort: Pulmonary effort is normal.      Breath sounds: Normal breath sounds.   Genitourinary:     Comments: No urinary symptoms at this time      Post 0  Skin:     General: Skin is warm and dry.   Neurological:      Mental Status: She is alert.         Labs:   none    Imaging:   None " "    Assessment    I have discussed this case with Dr Nascimento.    We reviewed the treatment options for stress incontinence including Bulkamid or urethral sling.  Patient at this time is willing to try vaginal estrogen and potentially another medication like trospium or myrbetriq to help with her OAB/urge.     She is ready to proceed with surgery for management of her stress incontinence and will discuss further management of stress incontinence as she wishes to improve her quality of life at this time and is \"sick of using pads\"    I have scheduled for her to have bulkamid injections with Dr Nascimento as she agrees this is what she would prefer at this time and is willing to try medical management for urge incontinence          Plan    Problem List Items Addressed This Visit       Urge incontinence     Vaginal Estrogen Cream Use     Proper Application Technique:     1. Wash Hands: Start by thoroughly washing your hands with soap and water to ensure cleanliness during the application process.     2. Read Instructions: Carefully read the instructions provided with the specific vaginal estrogen product you are using. Different formulations may have slightly different application methods and dosing schedules.     3. Choose a Comfortable Position: Find a comfortable position for application. Many individuals prefer lying on their back with their knees bent, but you can choose a position that works best for you.     4. Application of Cream or Tablet:     Cream: If using a vaginal estrogen cream, typically, a small amount is applied inside the vagina using an applicator provided with the product. You can also apply a small pea-sized amount to the tip of your finger. Rub the cream in after placement. It is important to get the cream inside the vaginal canal (where a tampon would be placed).    Tablet: For vaginal estrogen tablets, use the applicator to insert the tablet deeply into the vagina as directed by your healthcare " provider.     5. Insertion Technique:     If you are using an applicator, gently insert it into the vagina as far as is comfortable, while following the directions in the product's package insert.   If using a tablet without an applicator, insert it as deep into the vagina as possible using your finger.     6. Disposal: Dispose of the applicator or packaging as per the 's instructions.     7. Wash Hands Again: After inserting the vaginal estrogen, wash your hands thoroughly once more.     8. Follow Dosage Instructions: Use the medication as prescribed by your healthcare provider. Do not use more or less than the recommended dose, and adhere to the recommended schedule.     9. Stay Upright: Remain in an upright position for a short time after application to allow the medication to disperse and absorb properly.     10. Be Consistent: For best results, use vaginal estrogen consistently as directed by your healthcare provider. It may take some time before you notice significant improvement in your symptoms.     Risks and Benefits:     Benefits:     Symptom Relief: Vaginal estrogen can effectively relieve a range of urogenital symptoms associated with menopause, such as vaginal dryness, itching, burning, and pain during intercourse.   Improved Vaginal Health: It can help improve vaginal tissue health, including increased vaginal moisture and elasticity.   Reduced Urinary Symptoms: Vaginal estrogen can also alleviate certain urinary symptoms like frequent urination and urinary urgency in some individuals.   Reduced Urinary Tract Infections: Vaginal estrogen restores healthy vaginal bacteria that are protective against urinary tract infections.     Risks:     Systemic Absorption: While the risk is low, some estrogen from vaginal applications can be absorbed into the bloodstream. This may carry a small risk of systemic side effects like breast tenderness, headache, or changes in mood. However, the systemic  absorption is much lower with vaginal estrogen than with oral hormone therapy.   Breast Cancer Risk: There is long term safety data that supports the use of vaginal estrogen cream in patients with a family history of breast cancer or a personal history of treated breast cancer that is in remission. It does not increase the risk of recurrence in patients that have previously been treated for breast cancer.   Vaginal Irritation: Some individuals may experience vaginal irritation, itching, or discharge as a side effect of vaginal estrogen.

## 2023-12-22 ENCOUNTER — TELEPHONE (OUTPATIENT)
Dept: UROLOGY | Facility: MEDICAL CENTER | Age: 53
End: 2023-12-22
Payer: COMMERCIAL

## 2024-01-02 ENCOUNTER — TELEPHONE (OUTPATIENT)
Dept: UROLOGY | Facility: MEDICAL CENTER | Age: 54
End: 2024-01-02
Payer: COMMERCIAL

## 2024-01-02 NOTE — TELEPHONE ENCOUNTER
----- Message from Julia Quevedo, Med Ass't sent at 1/2/2024  2:05 PM PST -----  Regarding: Doctor's Note  Hi friends,     This patient is scheduled for surgery with Dr. Nascimento on 01/12/24 for her Cytoscopy, injection of bulkamid. She is in need of a Doctor's Note for work and is hoping to pick this up tomorrow when she is in office making a payment if at all possible.     Thank you!

## 2024-01-02 NOTE — LETTER
January 3, 2024    To Whom It May Concern:         This is confirmation that Palak Brunner has a scheduled appointment with Sanam Nascimento M.D. on 1/12/24. She may return to work the following day.         If you have any questions please do not hesitate to call me at the phone number listed below.    Sincerely,        Sanam Nascimento M.D.  [phone]

## 2024-01-02 NOTE — TELEPHONE ENCOUNTER
Will the pt be out from work for longer than the scheduled appointment? She is requesting a work note.

## 2024-01-05 ENCOUNTER — APPOINTMENT (OUTPATIENT)
Dept: ADMISSIONS | Facility: MEDICAL CENTER | Age: 54
End: 2024-01-05
Attending: STUDENT IN AN ORGANIZED HEALTH CARE EDUCATION/TRAINING PROGRAM
Payer: COMMERCIAL

## 2024-01-09 ENCOUNTER — PRE-ADMISSION TESTING (OUTPATIENT)
Dept: ADMISSIONS | Facility: MEDICAL CENTER | Age: 54
End: 2024-01-09
Attending: STUDENT IN AN ORGANIZED HEALTH CARE EDUCATION/TRAINING PROGRAM
Payer: COMMERCIAL

## 2024-01-09 RX ORDER — ASPIRIN 325 MG
325 TABLET ORAL EVERY 6 HOURS PRN
COMMUNITY

## 2024-01-09 RX ORDER — OMEPRAZOLE 20 MG/1
20 CAPSULE, DELAYED RELEASE ORAL DAILY
COMMUNITY

## 2024-01-12 ENCOUNTER — ANESTHESIA EVENT (OUTPATIENT)
Dept: SURGERY | Facility: MEDICAL CENTER | Age: 54
End: 2024-01-12
Payer: COMMERCIAL

## 2024-01-12 ENCOUNTER — ANESTHESIA (OUTPATIENT)
Dept: SURGERY | Facility: MEDICAL CENTER | Age: 54
End: 2024-01-12
Payer: COMMERCIAL

## 2024-01-12 ENCOUNTER — HOSPITAL ENCOUNTER (OUTPATIENT)
Facility: MEDICAL CENTER | Age: 54
End: 2024-01-12
Attending: STUDENT IN AN ORGANIZED HEALTH CARE EDUCATION/TRAINING PROGRAM | Admitting: STUDENT IN AN ORGANIZED HEALTH CARE EDUCATION/TRAINING PROGRAM
Payer: COMMERCIAL

## 2024-01-12 VITALS
WEIGHT: 157.19 LBS | OXYGEN SATURATION: 94 % | DIASTOLIC BLOOD PRESSURE: 80 MMHG | SYSTOLIC BLOOD PRESSURE: 128 MMHG | HEART RATE: 83 BPM | RESPIRATION RATE: 16 BRPM | HEIGHT: 64 IN | TEMPERATURE: 97 F | BODY MASS INDEX: 26.84 KG/M2

## 2024-01-12 DIAGNOSIS — N39.3 STRESS INCONTINENCE: ICD-10-CM

## 2024-01-12 PROCEDURE — L8606 SYNTHETIC IMPLNT URINARY 1ML: HCPCS | Performed by: STUDENT IN AN ORGANIZED HEALTH CARE EDUCATION/TRAINING PROGRAM

## 2024-01-12 PROCEDURE — 51715 ENDOSCOPIC INJECTION/IMPLANT: CPT | Performed by: STUDENT IN AN ORGANIZED HEALTH CARE EDUCATION/TRAINING PROGRAM

## 2024-01-12 PROCEDURE — 160036 HCHG PACU - EA ADDL 30 MINS PHASE I: Performed by: STUDENT IN AN ORGANIZED HEALTH CARE EDUCATION/TRAINING PROGRAM

## 2024-01-12 PROCEDURE — 700111 HCHG RX REV CODE 636 W/ 250 OVERRIDE (IP): Mod: JZ | Performed by: STUDENT IN AN ORGANIZED HEALTH CARE EDUCATION/TRAINING PROGRAM

## 2024-01-12 PROCEDURE — 160025 RECOVERY II MINUTES (STATS): Performed by: STUDENT IN AN ORGANIZED HEALTH CARE EDUCATION/TRAINING PROGRAM

## 2024-01-12 PROCEDURE — 700102 HCHG RX REV CODE 250 W/ 637 OVERRIDE(OP): Performed by: STUDENT IN AN ORGANIZED HEALTH CARE EDUCATION/TRAINING PROGRAM

## 2024-01-12 PROCEDURE — 160039 HCHG SURGERY MINUTES - EA ADDL 1 MIN LEVEL 3: Performed by: STUDENT IN AN ORGANIZED HEALTH CARE EDUCATION/TRAINING PROGRAM

## 2024-01-12 PROCEDURE — 160048 HCHG OR STATISTICAL LEVEL 1-5: Performed by: STUDENT IN AN ORGANIZED HEALTH CARE EDUCATION/TRAINING PROGRAM

## 2024-01-12 PROCEDURE — 160002 HCHG RECOVERY MINUTES (STAT): Performed by: STUDENT IN AN ORGANIZED HEALTH CARE EDUCATION/TRAINING PROGRAM

## 2024-01-12 PROCEDURE — 700105 HCHG RX REV CODE 258: Performed by: STUDENT IN AN ORGANIZED HEALTH CARE EDUCATION/TRAINING PROGRAM

## 2024-01-12 PROCEDURE — 160035 HCHG PACU - 1ST 60 MINS PHASE I: Performed by: STUDENT IN AN ORGANIZED HEALTH CARE EDUCATION/TRAINING PROGRAM

## 2024-01-12 PROCEDURE — A9270 NON-COVERED ITEM OR SERVICE: HCPCS | Performed by: STUDENT IN AN ORGANIZED HEALTH CARE EDUCATION/TRAINING PROGRAM

## 2024-01-12 PROCEDURE — 700101 HCHG RX REV CODE 250: Performed by: STUDENT IN AN ORGANIZED HEALTH CARE EDUCATION/TRAINING PROGRAM

## 2024-01-12 PROCEDURE — 160009 HCHG ANES TIME/MIN: Performed by: STUDENT IN AN ORGANIZED HEALTH CARE EDUCATION/TRAINING PROGRAM

## 2024-01-12 PROCEDURE — 160028 HCHG SURGERY MINUTES - 1ST 30 MINS LEVEL 3: Performed by: STUDENT IN AN ORGANIZED HEALTH CARE EDUCATION/TRAINING PROGRAM

## 2024-01-12 PROCEDURE — 160046 HCHG PACU - 1ST 60 MINS PHASE II: Performed by: STUDENT IN AN ORGANIZED HEALTH CARE EDUCATION/TRAINING PROGRAM

## 2024-01-12 DEVICE — SYSTEM KIT BULKAMID URETHRAL BULKING (1/EA) ***MUST ORDER A MIN OF 5 EA***: Type: IMPLANTABLE DEVICE | Site: BLADDER | Status: FUNCTIONAL

## 2024-01-12 RX ORDER — SODIUM CHLORIDE, SODIUM LACTATE, POTASSIUM CHLORIDE, CALCIUM CHLORIDE 600; 310; 30; 20 MG/100ML; MG/100ML; MG/100ML; MG/100ML
INJECTION, SOLUTION INTRAVENOUS
Status: DISCONTINUED | OUTPATIENT
Start: 2024-01-12 | End: 2024-01-12 | Stop reason: SURG

## 2024-01-12 RX ORDER — OXYCODONE HCL 5 MG/5 ML
5 SOLUTION, ORAL ORAL
Status: DISCONTINUED | OUTPATIENT
Start: 2024-01-12 | End: 2024-01-12 | Stop reason: HOSPADM

## 2024-01-12 RX ORDER — HYDROMORPHONE HYDROCHLORIDE 1 MG/ML
0.1 INJECTION, SOLUTION INTRAMUSCULAR; INTRAVENOUS; SUBCUTANEOUS
Status: DISCONTINUED | OUTPATIENT
Start: 2024-01-12 | End: 2024-01-12 | Stop reason: HOSPADM

## 2024-01-12 RX ORDER — EPHEDRINE SULFATE 50 MG/ML
INJECTION, SOLUTION INTRAVENOUS PRN
Status: DISCONTINUED | OUTPATIENT
Start: 2024-01-12 | End: 2024-01-12 | Stop reason: SURG

## 2024-01-12 RX ORDER — LIDOCAINE HYDROCHLORIDE 20 MG/ML
INJECTION, SOLUTION EPIDURAL; INFILTRATION; INTRACAUDAL; PERINEURAL PRN
Status: DISCONTINUED | OUTPATIENT
Start: 2024-01-12 | End: 2024-01-12 | Stop reason: SURG

## 2024-01-12 RX ORDER — MIDAZOLAM HYDROCHLORIDE 1 MG/ML
INJECTION INTRAMUSCULAR; INTRAVENOUS PRN
Status: DISCONTINUED | OUTPATIENT
Start: 2024-01-12 | End: 2024-01-12 | Stop reason: HOSPADM

## 2024-01-12 RX ORDER — SODIUM CHLORIDE, SODIUM LACTATE, POTASSIUM CHLORIDE, CALCIUM CHLORIDE 600; 310; 30; 20 MG/100ML; MG/100ML; MG/100ML; MG/100ML
INJECTION, SOLUTION INTRAVENOUS CONTINUOUS
Status: DISCONTINUED | OUTPATIENT
Start: 2024-01-12 | End: 2024-01-12 | Stop reason: HOSPADM

## 2024-01-12 RX ORDER — HYDROMORPHONE HYDROCHLORIDE 1 MG/ML
0.4 INJECTION, SOLUTION INTRAMUSCULAR; INTRAVENOUS; SUBCUTANEOUS
Status: DISCONTINUED | OUTPATIENT
Start: 2024-01-12 | End: 2024-01-12 | Stop reason: HOSPADM

## 2024-01-12 RX ORDER — SODIUM CHLORIDE, SODIUM LACTATE, POTASSIUM CHLORIDE, CALCIUM CHLORIDE 600; 310; 30; 20 MG/100ML; MG/100ML; MG/100ML; MG/100ML
INJECTION, SOLUTION INTRAVENOUS CONTINUOUS
Status: ACTIVE | OUTPATIENT
Start: 2024-01-12 | End: 2024-01-12

## 2024-01-12 RX ORDER — CEFAZOLIN SODIUM 1 G/3ML
INJECTION, POWDER, FOR SOLUTION INTRAMUSCULAR; INTRAVENOUS PRN
Status: DISCONTINUED | OUTPATIENT
Start: 2024-01-12 | End: 2024-01-12 | Stop reason: SURG

## 2024-01-12 RX ORDER — HYDROMORPHONE HYDROCHLORIDE 1 MG/ML
0.2 INJECTION, SOLUTION INTRAMUSCULAR; INTRAVENOUS; SUBCUTANEOUS
Status: DISCONTINUED | OUTPATIENT
Start: 2024-01-12 | End: 2024-01-12 | Stop reason: HOSPADM

## 2024-01-12 RX ORDER — DIPHENHYDRAMINE HYDROCHLORIDE 50 MG/ML
12.5 INJECTION INTRAMUSCULAR; INTRAVENOUS
Status: DISCONTINUED | OUTPATIENT
Start: 2024-01-12 | End: 2024-01-12 | Stop reason: HOSPADM

## 2024-01-12 RX ORDER — PHENAZOPYRIDINE HYDROCHLORIDE 200 MG/1
200 TABLET, FILM COATED ORAL 3 TIMES DAILY PRN
Qty: 9 TABLET | Refills: 0 | Status: SHIPPED | OUTPATIENT
Start: 2024-01-12

## 2024-01-12 RX ORDER — MEPERIDINE HYDROCHLORIDE 25 MG/ML
12.5 INJECTION INTRAMUSCULAR; INTRAVENOUS; SUBCUTANEOUS
Status: DISCONTINUED | OUTPATIENT
Start: 2024-01-12 | End: 2024-01-12 | Stop reason: HOSPADM

## 2024-01-12 RX ORDER — DEXAMETHASONE SODIUM PHOSPHATE 4 MG/ML
INJECTION, SOLUTION INTRA-ARTICULAR; INTRALESIONAL; INTRAMUSCULAR; INTRAVENOUS; SOFT TISSUE PRN
Status: DISCONTINUED | OUTPATIENT
Start: 2024-01-12 | End: 2024-01-12 | Stop reason: SURG

## 2024-01-12 RX ORDER — HYDRALAZINE HYDROCHLORIDE 20 MG/ML
5 INJECTION INTRAMUSCULAR; INTRAVENOUS
Status: DISCONTINUED | OUTPATIENT
Start: 2024-01-12 | End: 2024-01-12 | Stop reason: HOSPADM

## 2024-01-12 RX ORDER — HALOPERIDOL 5 MG/ML
1 INJECTION INTRAMUSCULAR
Status: DISCONTINUED | OUTPATIENT
Start: 2024-01-12 | End: 2024-01-12 | Stop reason: HOSPADM

## 2024-01-12 RX ORDER — ONDANSETRON 2 MG/ML
4 INJECTION INTRAMUSCULAR; INTRAVENOUS
Status: DISCONTINUED | OUTPATIENT
Start: 2024-01-12 | End: 2024-01-12 | Stop reason: HOSPADM

## 2024-01-12 RX ORDER — ACETAMINOPHEN 500 MG
1000 TABLET ORAL ONCE
Status: COMPLETED | OUTPATIENT
Start: 2024-01-12 | End: 2024-01-12

## 2024-01-12 RX ORDER — CELECOXIB 200 MG/1
200 CAPSULE ORAL ONCE
Status: COMPLETED | OUTPATIENT
Start: 2024-01-12 | End: 2024-01-12

## 2024-01-12 RX ORDER — EPHEDRINE SULFATE 50 MG/ML
5 INJECTION, SOLUTION INTRAVENOUS
Status: DISCONTINUED | OUTPATIENT
Start: 2024-01-12 | End: 2024-01-12 | Stop reason: HOSPADM

## 2024-01-12 RX ORDER — OXYCODONE HCL 5 MG/5 ML
10 SOLUTION, ORAL ORAL
Status: DISCONTINUED | OUTPATIENT
Start: 2024-01-12 | End: 2024-01-12 | Stop reason: HOSPADM

## 2024-01-12 RX ADMIN — PROPOFOL 50 MG: 10 INJECTION, EMULSION INTRAVENOUS at 08:19

## 2024-01-12 RX ADMIN — PROPOFOL 50 MG: 10 INJECTION, EMULSION INTRAVENOUS at 08:23

## 2024-01-12 RX ADMIN — LIDOCAINE HYDROCHLORIDE 0.5 ML: 10 SOLUTION INTRAVENOUS at 06:52

## 2024-01-12 RX ADMIN — FENTANYL CITRATE 50 MCG: 50 INJECTION, SOLUTION INTRAMUSCULAR; INTRAVENOUS at 08:28

## 2024-01-12 RX ADMIN — SODIUM CHLORIDE, POTASSIUM CHLORIDE, SODIUM LACTATE AND CALCIUM CHLORIDE: 600; 310; 30; 20 INJECTION, SOLUTION INTRAVENOUS at 06:53

## 2024-01-12 RX ADMIN — EPHEDRINE SULFATE 10 MG: 50 INJECTION, SOLUTION INTRAVENOUS at 08:33

## 2024-01-12 RX ADMIN — CELECOXIB 200 MG: 200 CAPSULE ORAL at 06:52

## 2024-01-12 RX ADMIN — LIDOCAINE HYDROCHLORIDE 80 MG: 20 INJECTION, SOLUTION EPIDURAL; INFILTRATION; INTRACAUDAL at 08:13

## 2024-01-12 RX ADMIN — MIDAZOLAM HYDROCHLORIDE 2 MG: 1 INJECTION, SOLUTION INTRAMUSCULAR; INTRAVENOUS at 08:07

## 2024-01-12 RX ADMIN — DEXAMETHASONE SODIUM PHOSPHATE 4 MG: 4 INJECTION INTRA-ARTICULAR; INTRALESIONAL; INTRAMUSCULAR; INTRAVENOUS; SOFT TISSUE at 08:16

## 2024-01-12 RX ADMIN — PROPOFOL 100 MG: 10 INJECTION, EMULSION INTRAVENOUS at 08:13

## 2024-01-12 RX ADMIN — CEFAZOLIN 2 G: 1 INJECTION, POWDER, FOR SOLUTION INTRAMUSCULAR; INTRAVENOUS at 08:23

## 2024-01-12 RX ADMIN — ACETAMINOPHEN 1000 MG: 500 TABLET ORAL at 06:52

## 2024-01-12 RX ADMIN — SODIUM CHLORIDE, POTASSIUM CHLORIDE, SODIUM LACTATE AND CALCIUM CHLORIDE: 600; 310; 30; 20 INJECTION, SOLUTION INTRAVENOUS at 08:09

## 2024-01-12 ASSESSMENT — PAIN DESCRIPTION - PAIN TYPE
TYPE: SURGICAL PAIN
TYPE: SURGICAL PAIN

## 2024-01-12 ASSESSMENT — PAIN SCALES - GENERAL: PAIN_LEVEL: 2

## 2024-01-12 ASSESSMENT — FIBROSIS 4 INDEX: FIB4 SCORE: 0.88

## 2024-01-12 NOTE — ANESTHESIA PREPROCEDURE EVALUATION
Case: [id number] Date/Time: 01/12/24 0745    Procedure: CYSTOSCOPY INJECTION OF POLYARYLAMIDE HYDROGEL    Pre-op diagnosis: URGE INCONTINENCE    Location: [facility] OR 14 / SURGERY [facility]    Surgeons: Sanam Nascimento M.D.            Relevant Problems   CARDIAC   (positive) Varicose veins of both lower extremities       Physical Exam    Airway   Mallampati: II  TM distance: >3 FB  Neck ROM: full       Cardiovascular - normal exam  Rhythm: regular  Rate: normal  (-) murmur     Dental - normal exam           Pulmonary - normal exam  Breath sounds clear to auscultation     Abdominal    Neurological - normal exam                   Anesthesia Plan    ASA 2       Plan - general       Airway plan will be LMA          Induction: intravenous    Postoperative Plan: Postoperative administration of opioids is intended.    Pertinent diagnostic labs and testing reviewed    Informed Consent:    Anesthetic plan and risks discussed with patient.    Use of blood products discussed with: patient whom consented to blood products.

## 2024-01-12 NOTE — OP REPORT
SURGEON: Dr. Sanam Nascimento     ANESTHESIA: General (general endotracheal tube)     PRE-OPERATIVE DIAGNOSIS: Stress Incontinence     POST-OPERATIVE DIAGNOSIS: Same     NAME OF PROCEDURE: Cystoscopy, endoscopic injection of implant material into the submucosal tissues of the urethra     FINDINGS OF PROCEDURE: Bulkamid injected into 4 locations at the 2 O’clock, 5 O’clock, 7 O’clock and 10 O’clock positions with good coaptation noted.     EBL: 0 cc     COMPLICATIONS: None     PATIENT CONDITION: stable     INDICATIONS: Palak Brunner is a 53 y.o. female who agreed to above procedure for further management of stress incontinence after complete discussion of risks, benefits, and alternatives. They understand the main risk to be post-operative retention, recurrence of stress incontinence.      PROCEDURE:     After informed consent was obtained in the preoperative care unit, the patient was taken to the OR on a stretcher. The patient was properly identified and placed in supine position per OR protocol. The patient was given a prophylactic dose of ancef 2 grams. General (general endotracheal tube) was administered. The patient was then placed in dorsal lithotomy, prepped and draped in a standard sterile fashion.  A timeout was performed with all parties in agreement.      The flow rate of the scope was set to a trickle with the roller ball, the stopcock was then used to turn off the flow. The bulkamid scope was inserted per urethra. The patient’s bladder was emptied. I then replaced the lens into the bulkamid sheath. The inflow was turned on, the bladder was filled with approximately 300cc. The needle was inserted until the 2 cm fabian was visible. The scope and needle were then withdrawn together until the tip of the needle was at the bladder neck. The needle was then retracted. The Bulkamid was then injected at the 2 O’clock, 5 O’clock, 7 O’clock and 10 O’clock positions. I noted good coaptation of the four  cushions. The patient’s bladder was left partially full. They were awoken from anesthesia and transferred to the PACU in stable condition.     DISPOSITION: The patient will be discharged home with plan for follow-up in 4 weeks.

## 2024-01-12 NOTE — ANESTHESIA POSTPROCEDURE EVALUATION
Patient: Palak Brunner    Procedure Summary       Date: 01/12/24 Room / Location: [place] [address] / SURGERY [facility]    Anesthesia Start: 0809 Anesthesia Stop: 0847    Procedure: CYSTOSCOPY INJECTION OF POLYARYLAMIDE HYDROGEL (Bladder) Diagnosis: (STRESS INCONTINENCE)    Surgeons: Sanam Nascimento M.D. Responsible Provider: Willie Romero D.O.    Anesthesia Type: general ASA Status: 2            Final Anesthesia Type: general  Last vitals  BP   Blood Pressure: (!) 75/45    Temp   36.1 °C (97 °F)    Pulse   77   Resp   20    SpO2   97 %      Anesthesia Post Evaluation    Patient location during evaluation: PACU  Patient participation: complete - patient participated  Level of consciousness: awake and alert  Pain score: 2    Airway patency: patent  Anesthetic complications: no  Cardiovascular status: hemodynamically stable  Respiratory status: acceptable  Hydration status: euvolemic    PONV: none          No notable events documented.     Nurse Pain Score: 0 (NPRS)

## 2024-01-12 NOTE — OR NURSING
Patient is awake and alert. Tolerating water without issue. VSS.     Pt denies pain and nausea at this time. Son called and updated on pt status    Report called to [facility]. Pt transported to phase 2 in stable condition.

## 2024-01-12 NOTE — DISCHARGE INSTRUCTIONS
Chief Complaint   Patient presents with   • Cough     Onset- last Friday- wet cough- sinus congestion-        HISTORY OF PRESENT ILLNESS:   The patient is a 8 year old female who presents with 5 days runny nose, nasal congestion, productive cough.  Parents have not noticed any fevers, wheezing or shortness of breath.  They have tried over-the-counter cough medicine with little to no relief.    PAST MEDICAL HISTORY, PAST SURGICAL HISTORY, SOCIAL HISTORY, MEDICATIONS, AND ALLERGIES:  Reviewed per electronic chart.    REVIEW OF SYSTEMS:   Constitutional:  Denies fever, body aches, fatigue or chills   Eyes:  Denies change in visual acuity   ENT: Runny nose, sinus congestion. Denies sore throat or ear pain.  Respiratory: Cough. Denies wheezing or shortness of breath   Cardiovascular:  Denies chest pain or edema   Gastrointestinal:  Denies abdominal pain, nausea, vomiting, bloody stools or diarrhea   Neurologic:  Denies headache    PHYSICAL EXAM:  Vitals:   Vitals:    10/06/21 1231   BP: 102/62   Pulse: 88   Resp: (!) 16   Temp: 97.1 °F (36.2 °C)   TempSrc: Tympanic   SpO2: 97%   Weight: 36.5 kg (80 lb 7 oz)      Constitutional:  No acute distress, nontoxic appearance.  HENT: Normocephalic, atraumatic. Bilateral external ears normal. Oropharynx moist. No oral exudates. Nasal mucosa appear boggy with copious amounts of clear discharge.  Bilateral tympanic membranes appear normal, no erythema or exudates.  Eyes:  PERRLA (pupils equal, round, and reactive to light and accommodation), EOMI (extraocular movements are intact). Conjunctivae normal. No discharge.  Neck:  Normal range of motion. No tenderness. Supple. No lymphadenopathy. No stridor.  Cardiovascular:  Normal heart rate. Normal rhythm. No murmurs. No rubs. No gallops.  Pulmonary/Chest:  Clear to auscultation throughout. No respiratory distress. No wheezing.  Neuro: Alert and orientated x3. Speech is clear.    Labs/Radiology:  Orders Placed This Encounter   • 2019  HOME CARE INSTRUCTIONS    ACTIVITY: Rest and take it easy for the first 24 hours.  A responsible adult is recommended to remain with you during that time.  It is normal to feel sleepy.  We encourage you to not do anything that requires balance, judgment or coordination.    FOR 24 HOURS DO NOT:  Drive, operate machinery or run household appliances.  Drink beer or alcoholic beverages.  Make important decisions or sign legal documents.    SPECIAL INSTRUCTIONS:   Cystoscopy  Cystoscopy is a procedure that is used to help diagnose and sometimes treat conditions that affect the lower urinary tract. The lower urinary tract includes the bladder and the urethra. The urethra is the tube that drains urine from the bladder. Cystoscopy is done using a thin, tube-shaped instrument with a light and camera at the end (cystoscope). The cystoscope may be hard or flexible, depending on the goal of the procedure. The cystoscope is inserted through the urethra, into the bladder.  Cystoscopy may be recommended if you have:  Urinary tract infections that keep coming back.  Blood in the urine (hematuria).  An inability to control when you urinate (urinary incontinence) or an overactive bladder.  Unusual cells found in a urine sample.  A blockage in the urethra, such as a urinary stone.  Painful urination.  An abnormality in the bladder found during an intravenous pyelogram (IVP) or CT scan.  Cystoscopy may also be done to remove a sample of tissue to be examined under a microscope (biopsy).  Tell a health care provider about:  Any allergies you have.  All medicines you are taking, including vitamins, herbs, eye drops, creams, and over-the-counter medicines.  Any problems you or family members have had with anesthetic medicines.  Any blood disorders you have.  Any surgeries you have had.  Any medical conditions you have.  Whether you are pregnant or may be pregnant.  What are the risks?  Generally, this is a safe procedure. However,  Novel Coronavirus (SARS-CoV-2)   • COVID DIAGNOSTIC TESTING   • cetirizine (ZyrTEC) 5 MG/5ML solution       ASSESSMENT:   1. Acute upper respiratory infection    2. Suspected COVID-19 virus infection        PLAN:  Sample collected for COVID-19 PCR testing, they will be notified of the results.  Discussed home isolation precautions, anticipatory guidance, and home supportive treatment options.  Encouraged adequate hydration and relative rest.  Recommend they start Children's Zyrtec as directed for symptomatic relief.  Educational handout provided to parents for review.    Followup with primary if no improvement of symptoms or worsening. Patient acknowledged understanding of the instructions and are agreeable to plan.    Collaborating physician is Dr. Jossue Strong.   problems may occur, including:  Infection.  Bleeding.  Allergic reactions to medicines.  Damage to other structures or organs.  What happens before the procedure?  Medicines  Ask your health care provider about:  Changing or stopping your regular medicines. This is especially important if you are taking diabetes medicines or blood thinners.  Taking medicines such as aspirin and ibuprofen. These medicines can thin your blood. Do not take these medicines unless your health care provider tells you to take them.  Taking over-the-counter medicines, vitamins, herbs, and supplements.  Tests  You may have an exam or testing, such as:  X-rays of the bladder, urethra, or kidneys.  CT scan of the abdomen or pelvis.  Urine tests to check for signs of infection.  General instructions  Follow instructions from your health care provider about eating or drinking restrictions.  Ask your health care provider what steps will be taken to help prevent infection. These steps may include:  Washing skin with a germ-killing soap.  Taking antibiotic medicine.  Plan to have a responsible adult take you home from the hospital or clinic.  What happens during the procedure?  You will be given one or more of the following:  A medicine to help you relax (sedative).  A medicine to numb the area (local anesthetic).  The area around the opening of your urethra will be cleaned.  The cystoscope will be passed through your urethra into your bladder.  Germ-free (sterile) fluid will flow through the cystoscope to fill your bladder. The fluid will stretch your bladder so that your health care provider can clearly examine your bladder walls.  Your doctor will look at the urethra and bladder. Your doctor may take a biopsy or remove stones.  The cystoscope will be removed, and your bladder will be emptied.  The procedure may vary among health care providers and hospitals.  What can I expect after the procedure?  After the procedure, it is common to  have:  Some soreness or pain in your abdomen and urethra.  Urinary symptoms. These include:  Mild pain or burning when you urinate. Pain should stop within a few minutes after you urinate. This may last for up to 1 week.  A small amount of blood in your urine for several days.  Feeling like you need to urinate but producing only a small amount of urine.  Follow these instructions at home:  Medicines  Take over-the-counter and prescription medicines only as told by your health care provider.  If you were prescribed an antibiotic medicine, take it as told by your health care provider. Do not stop taking the antibiotic even if you start to feel better.  General instructions  Return to your normal activities as told by your health care provider. Ask your health care provider what activities are safe for you.  If you were given a sedative during the procedure, it can affect you for several hours. Do not drive or operate machinery until your health care provider says that it is safe.  Watch for any blood in your urine. If the amount of blood in your urine increases, call your health care provider.  Follow instructions from your health care provider about eating or drinking restrictions.  If a tissue sample was removed for testing (biopsy) during your procedure, it is up to you to get your test results. Ask your health care provider, or the department that is doing the test, when your results will be ready.  Drink enough fluid to keep your urine pale yellow.  Keep all follow-up visits. This is important.  Contact a health care provider if:  You have pain that gets worse or does not get better with medicine, especially pain when you urinate.  You have trouble urinating.  You have more blood in your urine.  Get help right away if:  You have blood clots in your urine.  You have abdominal pain.  You have a fever or chills.  You are unable to urinate.  Summary  Cystoscopy is a procedure that is used to help diagnose and  sometimes treat conditions that affect the lower urinary tract.  Cystoscopy is done using a thin, tube-shaped instrument with a light and camera at the end.  After the procedure, it is common to have some soreness or pain in your abdomen and urethra.  Watch for any blood in your urine. If the amount of blood in your urine increases, call your health care provider.  If you were prescribed an antibiotic medicine, take it as told by your health care provider. Do not stop taking the antibiotic even if you start to feel better.  This information is not intended to replace advice given to you by your health care provider. Make sure you discuss any questions you have with your health care provider.  Document Revised: 08/31/2022 Document Reviewed: 07/30/2021  [employer] Patient Education © 2023 [employer]      DIET: To avoid nausea, slowly advance diet as tolerated, avoiding spicy or greasy foods for the first day.  Add more substantial food to your diet according to your physician's instructions.  Babies can be fed formula or breast milk as soon as they are hungry.  INCREASE FLUIDS AND FIBER TO AVOID CONSTIPATION.    SURGICAL DRESSING/BATHING: No dressing in place. May shower today, but no soaking in water for at least 1 week    MEDICATIONS: Resume taking daily medication.  Take prescribed pain medication with food.  If no medication is prescribed, you may take non-aspirin pain medication if needed.  PAIN MEDICATION CAN BE VERY CONSTIPATING.  Take a stool softener or laxative such as senokot, pericolace, or milk of magnesia if needed.    Prescription sent for Pyridium    A follow-up appointment should be arranged with your doctor in 1-2 weeks; call to schedule.    You should CALL YOUR PHYSICIAN if you develop:  Fever greater than 101 degrees F.  Pain not relieved by medication, or persistent nausea or vomiting.  Excessive bleeding (blood soaking through dressing) or unexpected drainage from the wound.  Extreme redness or  swelling around the incision site, drainage of pus or foul smelling drainage.  Inability to urinate or empty your bladder within 8 hours.  Problems with breathing or chest pain.    You should call 911 if you develop problems with breathing or chest pain.  If you are unable to contact your doctor or surgical center, you should go to the nearest emergency room or urgent care center.  Physician's telephone #: Dr. Nascimento [phone]     MILD FLU-LIKE SYMPTOMS ARE NORMAL.  YOU MAY EXPERIENCE GENERALIZED MUSCLE ACHES, THROAT IRRITATION, HEADACHE AND/OR SOME NAUSEA.    If any questions arise, call your doctor.  If your doctor is not available, please feel free to call the Surgical Center at [phone].  The Center is open Monday through Friday from 7AM to 7PM.      A registered nurse may call you a few days after your surgery to see how you are doing after your procedure.    You may also receive a survey in the mail within the next two weeks and we ask that you take a few moments to complete the survey and return it to us.  Our goal is to provide you with very good care and we value your comments.     Depression / Suicide Risk    As you are discharged from this [facility] facility, it is important to learn how to keep safe from harming yourself.    Recognize the warning signs:  Abrupt changes in personality, positive or negative- including increase in energy   Giving away possessions  Change in eating patterns- significant weight changes-  positive or negative  Change in sleeping patterns- unable to sleep or sleeping all the time   Unwillingness or inability to communicate  Depression  Unusual sadness, discouragement and loneliness  Talk of wanting to die  Neglect of personal appearance   Rebelliousness- reckless behavior  Withdrawal from people/activities they love  Confusion- inability to concentrate     If you or a loved one observes any of these behaviors or has concerns about self-harm, here's what you can  do:  Talk about it- your feelings and reasons for harming yourself  Remove any means that you might use to hurt yourself (examples: pills, rope, extension cords, firearm)  Get professional help from the community (Mental Health, Substance Abuse, psychological counseling)  Do not be alone:Call your Safe Contact- someone whom you trust who will be there for you.  Call your local CRISIS HOTLINE 157-8930 or 115-795-7928  Call your local Children's Mobile Crisis Response Team [place] (603) 874-2128 or www.Sleep.FM  Call the toll free National Suicide Prevention Hotlines   National Suicide Prevention Lifeline 702-588-EECN (1481)  National Hope Line Network 800-SUICIDE (867-9062)    I acknowledge receipt and understanding of these Home Care instructions.

## 2024-01-12 NOTE — ANESTHESIA TIME REPORT
Anesthesia Start and Stop Event Times       Date Time Event    1/12/2024 0737 Ready for Procedure     0809 Anesthesia Start     0847 Anesthesia Stop          Responsible Staff  01/12/24      Name Role Begin End    Willie Romero D.O. Anesth 0809 0847          Overtime Reason:  no overtime (within assigned shift)    Comments:

## 2024-01-12 NOTE — PROGRESS NOTES
Medication history reviewed with PT at bedside    [facility] rec is complete per PT reporting    Allergies reviewed.     Patient denies any outpatient antibiotics in the last 30 days.     Patient is not taking anticoagulants.    Preferred pharmacy for this visit - [facility] on [address] ([phone])

## 2024-01-12 NOTE — ANESTHESIA PROCEDURE NOTES
Airway    Date/Time: 1/12/2024 8:14 AM    Performed by: Willie Romero D.O.  Authorized by: Willie Romero D.O.    Location:  OR  Urgency:  Elective  Indications for Airway Management:  Anesthesia      Spontaneous Ventilation: absent    Sedation Level:  Deep  Preoxygenated: Yes    Final Airway Type:  Supraglottic airway  Final Supraglottic Airway:  Standard LMA    SGA Size:  4  Number of Attempts at Approach:  1

## 2024-01-12 NOTE — OR NURSING
0950: Pt arrives to phase II from PACU. VSS. Pt reports no pain. No dressing in place. Pt ambulated to bathroom independently with steady gait and successfully voided. Son is pts ride, will be here in approx 30 minutes     1017: Discharge instructions reviewed with pt and she verbalized understanding. Copy of instructions sent home with pt. Pt dressed independently    1045 IV removed. Dressing remains clean, dry, and intact. VSS. Pt dressed independently. Discharge instructions reviewed again with pt with her son present    1050: Pt wheeled to car with all belongings

## 2024-01-15 ENCOUNTER — HOSPITAL ENCOUNTER (OUTPATIENT)
Dept: LAB | Facility: MEDICAL CENTER | Age: 54
End: 2024-01-15
Attending: NURSE PRACTITIONER
Payer: COMMERCIAL

## 2024-01-15 ENCOUNTER — OFFICE VISIT (OUTPATIENT)
Dept: MEDICAL GROUP | Facility: PHYSICIAN GROUP | Age: 54
End: 2024-01-15
Payer: COMMERCIAL

## 2024-01-15 VITALS
TEMPERATURE: 97.7 F | HEIGHT: 64 IN | WEIGHT: 160 LBS | DIASTOLIC BLOOD PRESSURE: 60 MMHG | BODY MASS INDEX: 27.31 KG/M2 | SYSTOLIC BLOOD PRESSURE: 100 MMHG | HEART RATE: 85 BPM | OXYGEN SATURATION: 97 %

## 2024-01-15 DIAGNOSIS — G51.0 BELL'S PALSY: ICD-10-CM

## 2024-01-15 DIAGNOSIS — Z00.00 WELLNESS EXAMINATION: ICD-10-CM

## 2024-01-15 LAB
ALBUMIN SERPL BCP-MCNC: 4.4 G/DL (ref 3.2–4.9)
ALBUMIN/GLOB SERPL: 1.4 G/DL
ALP SERPL-CCNC: 69 U/L (ref 30–99)
ALT SERPL-CCNC: 13 U/L (ref 2–50)
ANION GAP SERPL CALC-SCNC: 13 MMOL/L (ref 7–16)
AST SERPL-CCNC: 16 U/L (ref 12–45)
BILIRUB SERPL-MCNC: 0.6 MG/DL (ref 0.1–1.5)
BUN SERPL-MCNC: 11 MG/DL (ref 8–22)
CALCIUM ALBUM COR SERPL-MCNC: 8.6 MG/DL (ref 8.5–10.5)
CALCIUM SERPL-MCNC: 8.9 MG/DL (ref 8.5–10.5)
CHLORIDE SERPL-SCNC: 102 MMOL/L (ref 96–112)
CHOLEST SERPL-MCNC: 191 MG/DL (ref 100–199)
CO2 SERPL-SCNC: 26 MMOL/L (ref 20–33)
CREAT SERPL-MCNC: 0.57 MG/DL (ref 0.5–1.4)
FASTING STATUS PATIENT QL REPORTED: NORMAL
GFR SERPLBLD CREATININE-BSD FMLA CKD-EPI: 108 ML/MIN/1.73 M 2
GLOBULIN SER CALC-MCNC: 3.2 G/DL (ref 1.9–3.5)
GLUCOSE SERPL-MCNC: 98 MG/DL (ref 65–99)
HDLC SERPL-MCNC: 46 MG/DL
LDLC SERPL CALC-MCNC: 117 MG/DL
POTASSIUM SERPL-SCNC: 4.1 MMOL/L (ref 3.6–5.5)
PROT SERPL-MCNC: 7.6 G/DL (ref 6–8.2)
SODIUM SERPL-SCNC: 141 MMOL/L (ref 135–145)
TRIGL SERPL-MCNC: 139 MG/DL (ref 0–149)

## 2024-01-15 PROCEDURE — 80061 LIPID PANEL: CPT

## 2024-01-15 PROCEDURE — 3078F DIAST BP <80 MM HG: CPT | Performed by: NURSE PRACTITIONER

## 2024-01-15 PROCEDURE — 80053 COMPREHEN METABOLIC PANEL: CPT

## 2024-01-15 PROCEDURE — 36415 COLL VENOUS BLD VENIPUNCTURE: CPT

## 2024-01-15 PROCEDURE — 3074F SYST BP LT 130 MM HG: CPT | Performed by: NURSE PRACTITIONER

## 2024-01-15 PROCEDURE — 99213 OFFICE O/P EST LOW 20 MIN: CPT | Performed by: NURSE PRACTITIONER

## 2024-01-15 RX ORDER — VALACYCLOVIR HYDROCHLORIDE 1 G/1
1000 TABLET, FILM COATED ORAL 3 TIMES DAILY
Qty: 21 TABLET | Refills: 0 | Status: SHIPPED | OUTPATIENT
Start: 2024-01-15 | End: 2024-01-22

## 2024-01-15 RX ORDER — PREDNISONE 20 MG/1
80 TABLET ORAL DAILY
Qty: 28 TABLET | Refills: 0 | Status: SHIPPED | OUTPATIENT
Start: 2024-01-15 | End: 2024-01-22

## 2024-01-15 ASSESSMENT — ENCOUNTER SYMPTOMS
HEADACHES: 0
PALPITATIONS: 0
WHEEZING: 0
COUGH: 0
FEVER: 0
CHILLS: 0
SHORTNESS OF BREATH: 0
ABDOMINAL PAIN: 0
DEPRESSION: 0
DIZZINESS: 0

## 2024-01-15 ASSESSMENT — PATIENT HEALTH QUESTIONNAIRE - PHQ9: CLINICAL INTERPRETATION OF PHQ2 SCORE: 0

## 2024-01-15 ASSESSMENT — FIBROSIS 4 INDEX: FIB4 SCORE: 0.88

## 2024-01-16 NOTE — ASSESSMENT & PLAN NOTE
-prednisone 80 mg PO daily x 7 days  -valacyclovir 1000 mg PO 3 times daily  -discussed artificial tears, taping her eye shut for sleep with artificial tears gel

## 2024-01-16 NOTE — PROGRESS NOTES
Subjective:     Chief Complaint   Patient presents with    Paralysis     Of face on left side x 1.5 days    Menopause     Medication     Eye Strain     Watery eyes left side x 1.5 days        HPI:   Palak presents today with     Problem   Bell's Palsy    This is a new issue. Started Saturday. Yesterday states she noticed it feels numbness. Drooping eyelid and mouth. States she is unable to smile or drink on that side. States it will run out of the right side of her mouth. Unable to close her eyelids completely. States happened the Friday after the injection for incontinence. No evidence of herpes. No other viral infection symptoms. No other numbness, tingling or neuro changes.         Current Outpatient Medications Ordered in Epic   Medication Sig Dispense Refill    predniSONE (DELTASONE) 20 MG Tab Take 4 Tablets by mouth every day for 7 days. 28 Tablet 0    valacyclovir (VALTREX) 1 GM Tab Take 1 Tablet by mouth 3 times a day for 7 days. 21 Tablet 0    phenazopyridine (PYRIDIUM) 200 MG Tab Take 1 Tablet by mouth 3 times a day as needed for Mild Pain. 9 Tablet 0    Nutritional Supplements (ESTROVEN PO) Take 1 Capsule by mouth every day.      omeprazole (PRILOSEC) 20 MG delayed-release capsule Take 20 mg by mouth every day.      aspirin (ASA) 325 MG Tab Take 325 mg by mouth every 6 hours as needed for Mild Pain.      estradiol (ESTRACE) 0.1 MG/GM vaginal cream Please apply the amount the size of a pea to vagina every day for one month.  Then apply every Monday, Wednesday, Friday (Patient not taking: Reported on 1/15/2024) 42.5 g 3     No current [facility]-ordered facility-administered medications on file.       Health Maintenance: Completed    Review of Systems   Constitutional:  Negative for chills, fever and malaise/fatigue.   Respiratory:  Negative for cough, shortness of breath and wheezing.    Cardiovascular:  Negative for chest pain, palpitations and leg swelling.   Gastrointestinal:  Negative for abdominal pain.  "  Neurological:  Negative for dizziness and headaches.   Psychiatric/Behavioral:  Negative for depression and suicidal ideas.          Objective:     Exam:  /60 (BP Location: Left arm, Patient Position: Sitting, BP Cuff Size: Adult)   Pulse 85   Temp 36.5 °C (97.7 °F) (Temporal)   Ht 1.626 m (5' 4\")   Wt 72.6 kg (160 lb)   LMP 01/13/2018   SpO2 97%   BMI 27.46 kg/m²  Body mass index is 27.46 kg/m².    Physical Exam  Constitutional:       Appearance: Normal appearance.   HENT:      Head: Normocephalic and atraumatic.   Eyes:      Extraocular Movements: Extraocular movements intact.      Pupils: Pupils are equal, round, and reactive to light.   Cardiovascular:      Rate and Rhythm: Normal rate and regular rhythm.      Pulses: Normal pulses.      Heart sounds: Normal heart sounds.   Pulmonary:      Effort: Pulmonary effort is normal.      Breath sounds: Normal breath sounds.   Skin:     General: Skin is warm and dry.      Capillary Refill: Capillary refill takes less than 2 seconds.   Neurological:      General: No focal deficit present.      Mental Status: She is alert and oriented to person, place, and time.      Cranial Nerves: Cranial nerve deficit and facial asymmetry present.      Sensory: Sensory deficit present.      Coordination: Coordination is intact.      Gait: Gait is intact.      Deep Tendon Reflexes:      Reflex Scores:       Patellar reflexes are 2+ on the right side and 2+ on the left side.     Comments: Deficit in right face  Strength 5/5 in upper and lower extremities       Assessment & Plan:     53 y.o. female with the following -     Problem List Items Addressed This Visit       Bell's palsy     -prednisone 80 mg PO daily x 7 days  -valacyclovir 1000 mg PO 3 times daily  -discussed artificial tears, taping her eye shut for sleep with artificial tears gel         Relevant Medications    predniSONE (DELTASONE) 20 MG Tab    valacyclovir (VALTREX) 1 GM Tab       I spent a total of 25 " minutes with record review, exam, communication with the patient, communication with other providers, and documentation of this encounter.      Return in about 1 week (around 1/22/2024), or if symptoms worsen or fail to improve.    I have placed the below orders and discussed them with an approved delegating provider. The MA is performing the below orders under the direction of Dr. Ramirez.     Please note that this dictation was created using voice recognition software. I have made every reasonable attempt to correct obvious errors, but I expect that there are errors of grammar and possibly content that I did not discover before finalizing the note.

## 2024-01-17 ENCOUNTER — TELEPHONE (OUTPATIENT)
Dept: MEDICAL GROUP | Facility: PHYSICIAN GROUP | Age: 54
End: 2024-01-17
Payer: COMMERCIAL

## 2024-01-17 NOTE — TELEPHONE ENCOUNTER
Phone Number Called: [phone] (home)       Call outcome: Spoke to patient regarding message below.    Message: Spoke to pt and she wanted to get a referral to Physical Therapy for facial massages to help with them Bell's Palsy I informed her pcp would like to discuss during appointment. She does have appt scheduled on 1/22/24 to follow up on Bell's Palsy she will discuss during appt.

## 2024-01-17 NOTE — TELEPHONE ENCOUNTER
VOICEMAIL  1. Caller Name: Palak Brunner                            Call Back Number: [phone] (home)       2. Message: LVM requesting a referral for facial paralysis. Please advise

## 2024-01-22 ENCOUNTER — OFFICE VISIT (OUTPATIENT)
Dept: MEDICAL GROUP | Facility: PHYSICIAN GROUP | Age: 54
End: 2024-01-22
Payer: COMMERCIAL

## 2024-01-22 VITALS
HEIGHT: 64 IN | WEIGHT: 160.8 LBS | OXYGEN SATURATION: 99 % | SYSTOLIC BLOOD PRESSURE: 124 MMHG | DIASTOLIC BLOOD PRESSURE: 70 MMHG | BODY MASS INDEX: 27.45 KG/M2 | HEART RATE: 81 BPM | TEMPERATURE: 97.1 F

## 2024-01-22 DIAGNOSIS — G51.0 BELL'S PALSY: ICD-10-CM

## 2024-01-22 PROCEDURE — 3078F DIAST BP <80 MM HG: CPT | Performed by: NURSE PRACTITIONER

## 2024-01-22 PROCEDURE — 99214 OFFICE O/P EST MOD 30 MIN: CPT | Performed by: NURSE PRACTITIONER

## 2024-01-22 PROCEDURE — 3074F SYST BP LT 130 MM HG: CPT | Performed by: NURSE PRACTITIONER

## 2024-01-22 ASSESSMENT — ENCOUNTER SYMPTOMS
WEAKNESS: 0
LOSS OF CONSCIOUSNESS: 0
TINGLING: 0
ABDOMINAL PAIN: 0
TREMORS: 0
COUGH: 0
PALPITATIONS: 0
SPEECH CHANGE: 1
FEVER: 0
FOCAL WEAKNESS: 1
CHILLS: 0
DIZZINESS: 0
HEADACHES: 0
SENSORY CHANGE: 1

## 2024-01-22 ASSESSMENT — FIBROSIS 4 INDEX: FIB4 SCORE: 0.98

## 2024-01-22 NOTE — LETTER
January 22, 2024        [place]ak Brunner  [address] [place] [address]      To Whom it May:    Please excuse Palak from work due to facial paralysis from 1/29/24-3/11/24.    If you have any questions or concerns, please don't hesitate to call.        Sincerely,        Nicolas Jain, A.P.R.N.    Electronically Signed

## 2024-01-23 NOTE — PROGRESS NOTES
"Subjective:     Chief Complaint   Patient presents with    Follow-Up     [place] palsy        HPI:   Palak presents today with     Problem   Bell's Palsy    Continued issue. RIght face. Started last Saturday. Continues to have decreased mobility, almost able to close her eye, some movement in her forehead, mouth continues to be minimal movement. Overall feeling improved.          Current Outpatient Medications Ordered in Epic   Medication Sig Dispense Refill    predniSONE (DELTASONE) 20 MG Tab Take 4 Tablets by mouth every day for 7 days. 28 Tablet 0    valacyclovir (VALTREX) 1 GM Tab Take 1 Tablet by mouth 3 times a day for 7 days. 21 Tablet 0    phenazopyridine (PYRIDIUM) 200 MG Tab Take 1 Tablet by mouth 3 times a day as needed for Mild Pain. 9 Tablet 0    Nutritional Supplements (ESTROVEN PO) Take 1 Capsule by mouth every day.      omeprazole (PRILOSEC) 20 MG delayed-release capsule Take 20 mg by mouth every day.      aspirin (ASA) 325 MG Tab Take 325 mg by mouth every 6 hours as needed for Mild Pain.      estradiol (ESTRACE) 0.1 MG/GM vaginal cream Please apply the amount the size of a pea to vagina every day for one month.  Then apply every Monday, Wednesday, Friday 42.5 g 3     No current [facility]-ordered facility-administered medications on file.       Health Maintenance: defer to follow-up    Review of Systems   Constitutional:  Negative for chills, fever and malaise/fatigue.   Respiratory:  Negative for cough.    Cardiovascular:  Negative for chest pain and palpitations.   Gastrointestinal:  Negative for abdominal pain.   Neurological:  Positive for sensory change, speech change and focal weakness. Negative for dizziness, tingling, tremors, loss of consciousness, weakness and headaches.         Objective:     Exam:  /70 (BP Location: Left arm, Patient Position: Sitting, BP Cuff Size: Adult)   Pulse 81   Temp 36.2 °C (97.1 °F) (Temporal)   Ht 1.626 m (5' 4\")   Wt 72.9 kg (160 lb 12.8 oz)   LMP 01/13/2018 "   SpO2 99%   BMI 27.60 kg/m²  Body mass index is 27.6 kg/m².    Physical Exam  Constitutional:       Appearance: Normal appearance.   Cardiovascular:      Rate and Rhythm: Normal rate and regular rhythm.      Pulses: Normal pulses.      Heart sounds: Normal heart sounds.   Pulmonary:      Effort: Pulmonary effort is normal.      Breath sounds: Normal breath sounds.   Skin:     General: Skin is warm and dry.      Capillary Refill: Capillary refill takes less than 2 seconds.   Neurological:      Mental Status: She is alert and oriented to person, place, and time.      Cranial Nerves: Cranial nerve deficit and facial asymmetry present.      Comments: House-Brackman IV: mouth asymmetrical with max effort, some forehead movement, incomplete eye closure, but improved from last week       Assessment & Plan:     53 y.o. female with the following -     Problem List Items Addressed This Visit       Bell's palsy     -medication almost complete  -referral to PT  -eye is dry, recommend gel tears for night         Relevant Orders    Referral to Physical Therapy       My total time spent caring for the patient on the day of the encounter was 30 minutes.   This does not include time spent on separately billable procedures/tests.      Return in about 6 weeks (around 3/4/2024).    I have placed the below orders and discussed them with an approved delegating provider. The MA is performing the below orders under the direction of Dr. Ramirez.     Please note that this dictation was created using voice recognition software. I have made every reasonable attempt to correct obvious errors, but I expect that there are errors of grammar and possibly content that I did not discover before finalizing the note.

## 2024-01-31 ENCOUNTER — TELEPHONE (OUTPATIENT)
Dept: MEDICAL GROUP | Facility: PHYSICIAN GROUP | Age: 54
End: 2024-01-31
Payer: COMMERCIAL

## 2024-01-31 NOTE — LETTER
February 1, 2024        Palak Gayle Brunner  [address] [address]        To whom it may concern:    Palak is cleared to return to work as of 2/1/2024, due to continued medical concerns hours must be limited to no more than 5 hours/day.    If you have any questions or concerns, please don't hesitate to call.        Sincerely,        David Clements Ass't    Electronically Signed

## 2024-01-31 NOTE — TELEPHONE ENCOUNTER
VOICEMAIL  1. Caller Name: Palak Brunner                        Call Back Number: [phone] (home)       2. Message: pt left vm stating she would like a note for her to return to work she says her eye is a lot better please advise.    3. Patient approves office to leave a detailed voicemail/MyChart message: yes

## 2024-02-01 NOTE — TELEPHONE ENCOUNTER
Called patient and they stated that they want to limit their hours to 5 hours. She also was wondering if her hours could be limited until her follow up appointment on 3/7/24.

## 2024-02-02 ENCOUNTER — TELEPHONE (OUTPATIENT)
Dept: MEDICAL GROUP | Facility: PHYSICIAN GROUP | Age: 54
End: 2024-02-02
Payer: COMMERCIAL

## 2024-02-02 NOTE — TELEPHONE ENCOUNTER
Pt is here today because her return to work letter stated she can return on 2/1/24 when it should have been 2/11/2024. She is aware Nicolas is out of office till Monday but her letter shows she should be at work already instead of the 11th. Please call ptatient with any questions. Thank you

## 2024-02-13 ENCOUNTER — OFFICE VISIT (OUTPATIENT)
Dept: UROLOGY | Facility: MEDICAL CENTER | Age: 54
End: 2024-02-13
Payer: COMMERCIAL

## 2024-02-13 VITALS
HEART RATE: 90 BPM | DIASTOLIC BLOOD PRESSURE: 71 MMHG | SYSTOLIC BLOOD PRESSURE: 106 MMHG | TEMPERATURE: 98.2 F | OXYGEN SATURATION: 97 % | HEIGHT: 64 IN | WEIGHT: 160 LBS | BODY MASS INDEX: 27.31 KG/M2

## 2024-02-13 DIAGNOSIS — N39.3 STRESS INCONTINENCE: ICD-10-CM

## 2024-02-13 DIAGNOSIS — N39.41 URGE INCONTINENCE: ICD-10-CM

## 2024-02-13 LAB
POC POST-VOID: 79 ML
POC PRE-VOID: 279 ML

## 2024-02-13 PROCEDURE — 99214 OFFICE O/P EST MOD 30 MIN: CPT | Performed by: STUDENT IN AN ORGANIZED HEALTH CARE EDUCATION/TRAINING PROGRAM

## 2024-02-13 PROCEDURE — 51798 US URINE CAPACITY MEASURE: CPT | Performed by: STUDENT IN AN ORGANIZED HEALTH CARE EDUCATION/TRAINING PROGRAM

## 2024-02-13 RX ORDER — OXYBUTYNIN CHLORIDE 10 MG/1
10 TABLET, EXTENDED RELEASE ORAL DAILY
Qty: 30 TABLET | Refills: 3 | Status: SHIPPED | OUTPATIENT
Start: 2024-02-13

## 2024-02-13 ASSESSMENT — FIBROSIS 4 INDEX: FIB4 SCORE: 0.98

## 2024-02-13 NOTE — LETTER
February 13, 2024    To Whom It May Concern:         This is confirmation that Palak Araujo Patricia Brunner attended her scheduled appointment with Sanam Nascimento M.D. on 2/13/24.         If you have any questions please do not hesitate to call me at the phone number listed below.    Sincerely,          Sanam Nascimento M.D.  [phone]

## 2024-02-13 NOTE — PROGRESS NOTES
Subjective  Palak Brunner is a 53 y.o. female who presents today for follow up of mixed urinary incontinence following Bulkamid injection.    Patient states the first week or so after surgery her stream was much slower and she did not have any leakage.  Now she is still having some small-volume leakage with cough and sneeze, though this is somewhat better than before.  She used to urinate once every hour, now going every 2 hours.  Only getting up once at night.  She states sometimes when she  coughs or sneezes she will feel as though she is in a leak but does not.    Family History   Problem Relation Age of Onset    Diabetes Neg Hx     Heart Disease Neg Hx     Cancer Neg Hx     Stroke Neg Hx        Social History     Socioeconomic History    Marital status:      Spouse name: Not on file    Number of children: Not on file    Years of education: Not on file    Highest education level: Not on file   Occupational History    Not on file   Tobacco Use    Smoking status: Never    Smokeless tobacco: Never    Tobacco comments:     aviod all tobacco products   Vaping Use    Vaping Use: Never used   Substance and Sexual Activity    Alcohol use: Not Currently     Comment: occasionally    Drug use: No    Sexual activity: Yes     Partners: Male     Birth control/protection: Pill   Other Topics Concern    Not on file   Social History Narrative    Not on file     Social Determinants of Health     Financial Resource Strain: Not on file   Food Insecurity: Not on file   Transportation Needs: Not on file   Physical Activity: Not on file   Stress: Not on file   Social Connections: Not on file   Intimate Partner Violence: Not on file   Housing Stability: Not on file       Past Surgical History:   Procedure Laterality Date    CYSTOSCOPY WITH COLLAGEN N/A 1/12/2024    Procedure: CYSTOSCOPY INJECTION OF POLYARYLAMIDE HYDROGEL;  Surgeon: Sanam Nascimento M.D.;  Location: SURGERY [facility];  Service: Urology    LASER  "ABLATION Left 06/10/2022    left greater saphenous vein    LESION EXCISION GENERAL Left 12/18/2017    Procedure: LESION EXCISION GENERAL - FOR RE-EXCISION UPPER ARM 2CM NEOPLASTIC SKIN LESION;  Surgeon: Wesley Corrigan M.D.;  Location: SURGERY SAME DAY [facility];  Service: General    HEMORRHOIDECTOMY  06/22/2017    Procedure: HEMORRHOIDECTOMY FOR EXCISION EXTERNAL HEMORRHOID/SKIN TAG;  Surgeon: Wesley Corrigan M.D.;  Location: SURGERY SAME DAY [facility];  Service:     CHOLECYSTECTOMY      PRIMARY C SECTION      US-CYST ASPIRATION-BREAST INITIAL         Past Medical History:   Diagnosis Date    Bilateral breast cysts 06/01/2011    Present for 10-12 years    Gastritis 11/20/2014    Psychiatric problem     Urinary incontinence 06/01/2017       Current Outpatient Medications   Medication Sig Dispense Refill    oxybutynin SR (DITROPAN-XL) 10 MG CR tablet Take 1 Tablet by mouth every day. 30 Tablet 3    Nutritional Supplements (ESTROVEN PO) Take 1 Capsule by mouth every day.      omeprazole (PRILOSEC) 20 MG delayed-release capsule Take 20 mg by mouth every day.      aspirin (ASA) 325 MG Tab Take 325 mg by mouth every 6 hours as needed for Mild Pain.      estradiol (ESTRACE) 0.1 MG/GM vaginal cream Please apply the amount the size of a pea to vagina every day for one month.  Then apply every Monday, Wednesday, Friday 42.5 g 3    phenazopyridine (PYRIDIUM) 200 MG Tab Take 1 Tablet by mouth 3 times a day as needed for Mild Pain. (Patient not taking: Reported on 2/13/2024) 9 Tablet 0     No current facility-administered medications for this visit.       No Known Allergies    Objective  /71 (BP Location: Left arm, Patient Position: Sitting, BP Cuff Size: Adult)   Pulse 90   Temp 36.8 °C (98.2 °F) (Temporal)   Ht 1.626 m (5' 4\")   Wt 72.6 kg (160 lb)   SpO2 97%   Physical Exam  Constitutional:       Appearance: Normal appearance.   HENT:      Head: Normocephalic and atraumatic.   Eyes:      Pupils: " Pupils are equal, round, and reactive to light.   Pulmonary:      Effort: No respiratory distress.   Skin:     General: Skin is warm and dry.   Neurological:      General: No focal deficit present.      Mental Status: She is alert.   Psychiatric:         Mood and Affect: Mood normal.         Behavior: Behavior normal.         Labs:     none    Imaging:     none    Assessment    Stress incontinence, overactive bladder with urge incontinence history of    -We discussed that she may be experiencing stress-induced detrusor overactivity and would likely benefit from a medication such as oxybutynin to control any bladder spasms.  We discussed this can take 4 weeks to have its full effect, we will see her back at that time with a PVR to see if her symptoms have been improving.    Plan    1. Stress incontinence  - POCT BLADDER SCAN    2. Urge incontinence    Other orders  - oxybutynin SR (DITROPAN-XL) 10 MG CR tablet; Take 1 Tablet by mouth every day.  Dispense: 30 Tablet; Refill: 3

## 2024-03-07 ENCOUNTER — APPOINTMENT (OUTPATIENT)
Dept: MEDICAL GROUP | Facility: PHYSICIAN GROUP | Age: 54
End: 2024-03-07
Payer: COMMERCIAL

## 2024-03-12 ENCOUNTER — OFFICE VISIT (OUTPATIENT)
Dept: MEDICAL GROUP | Facility: PHYSICIAN GROUP | Age: 54
End: 2024-03-12
Payer: COMMERCIAL

## 2024-03-12 VITALS
SYSTOLIC BLOOD PRESSURE: 100 MMHG | HEART RATE: 89 BPM | TEMPERATURE: 97.9 F | WEIGHT: 161.4 LBS | BODY MASS INDEX: 27.55 KG/M2 | OXYGEN SATURATION: 96 % | DIASTOLIC BLOOD PRESSURE: 60 MMHG | HEIGHT: 64 IN

## 2024-03-12 DIAGNOSIS — G51.0 BELL'S PALSY: ICD-10-CM

## 2024-03-12 DIAGNOSIS — E01.0 THYROMEGALY: ICD-10-CM

## 2024-03-12 DIAGNOSIS — N39.3 STRESS INCONTINENCE: ICD-10-CM

## 2024-03-12 PROCEDURE — 99213 OFFICE O/P EST LOW 20 MIN: CPT | Performed by: NURSE PRACTITIONER

## 2024-03-12 PROCEDURE — 3078F DIAST BP <80 MM HG: CPT | Performed by: NURSE PRACTITIONER

## 2024-03-12 PROCEDURE — 3074F SYST BP LT 130 MM HG: CPT | Performed by: NURSE PRACTITIONER

## 2024-03-12 ASSESSMENT — VISUAL ACUITY: OU: 1

## 2024-03-12 ASSESSMENT — FIBROSIS 4 INDEX: FIB4 SCORE: 0.98

## 2024-03-12 NOTE — PROGRESS NOTES
Verbal consent was acquired by the patient to use [employer] ambient listening note generation during this visit yes    Subjective:     HPI:   Palak is a 53 y.o.female established patient who presents today for:    History of Present Illness  The patient presents for evaluation of multiple medical concerns.    She had HIV screening done 3 visits ago, which was     Bell's palsy has resolved patient is able to close her eyes completely, states took the medication as prescribed and kept her eyes moist states that symptoms resolved 2 to 2.5 weeks after they started.      She had suburethral sling procedure done by Dr. Nascimento. She has an appointment with Dr. Nascimento, urologist, on 03/20/2024 for stress incontinence. She still has mild leakage with coughing and sneezing, but it is better than it was before. It makes her underwear damp a little bit. Her stream is normal. She was started on oxybutynin on 02/13/2024, and she has 1 more week left. She was told that after 2 weeks, the medication is supposed to start working out and after 4 weeks, she should have the full effect of the oxybutynin. She still gets up once at night to urinate, but not every night. She drinks water anytime she feels thirsty.     has a past medical history of Bilateral breast cysts (06/01/2011), Gastritis (11/20/2014), Psychiatric problem, and Urinary incontinence (06/01/2017).    She has no past medical history of Breast cancer (HCC).   has a past surgical history that includes cholecystectomy; primary c section; US-CYST ASPIRATION-BREAST INITIAL; hemorrhoidectomy (06/22/2017); lesion excision general (Left, 12/18/2017); laser ablation (Left, 06/10/2022); and cystoscopy with collagen (N/A, 1/12/2024).    Family History   Problem Relation Age of Onset    Diabetes Neg Hx     Heart Disease Neg Hx     Cancer Neg Hx     Stroke Neg Hx        Social History     Socioeconomic History    Marital status:      Spouse name: Not on file    Number of  children: Not on file    Years of education: Not on file    Highest education level: Not on file   Occupational History    Not on file   Tobacco Use    Smoking status: Never    Smokeless tobacco: Never    Tobacco comments:     aviod all tobacco products   Vaping Use    Vaping Use: Never used   Substance and Sexual Activity    Alcohol use: Not Currently     Comment: occasionally    Drug use: No    Sexual activity: Yes     Partners: Male     Birth control/protection: Pill   Other Topics Concern    Not on file   Social History Narrative    Not on file     Social Determinants of Health     Financial Resource Strain: Not on file   Food Insecurity: Not on file   Transportation Needs: Not on file   Physical Activity: Not on file   Stress: Not on file   Social Connections: Not on file   Intimate Partner Violence: Not on file   Housing Stability: Not on file       Patient Active Problem List    Diagnosis Date Noted    Bell's palsy 01/15/2024    Female stress incontinence 12/14/2023    Major depressive disorder 07/10/2023    Acute bilateral knee pain 07/10/2023    Thyromegaly 03/07/2023    Shoulder pain, right 03/07/2023    Dry eyes 03/07/2023    Menopause 06/15/2021    Joint pain 06/15/2021    Keloid scar 11/30/2018    Seborrheic keratoses 06/12/2018    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 30 Tablet 3    phenazopyridine (PYRIDIUM) 200 MG Tab Take 1 Tablet by mouth 3 times a day as needed for Mild Pain. 9 Tablet 0    Nutritional Supplements (ESTROVEN PO) Take 1 Capsule by mouth every day.      omeprazole (PRILOSEC) 20 MG delayed-release capsule Take 20 mg by mouth every day.      aspirin (ASA) 325 MG Tab Take 325 mg by mouth every 6  "hours as needed for Mild Pain.      estradiol (ESTRACE) 0.1 MG/GM vaginal cream Please apply the amount the size of a pea to vagina every day for one month.  Then apply every Monday, Wednesday, Friday 42.5 g 3     No current [facility]-ordered facility-administered medications on file.     No Known Allergies    Health Maintenance: discuss at next appointment    Objective:     Exam:  /60 (BP Location: Left arm, Patient Position: Sitting, BP Cuff Size: Adult)   Pulse 89   Temp 36.6 °C (97.9 °F) (Temporal)   Ht 1.626 m (5' 4\")   Wt 73.2 kg (161 lb 6.4 oz)   LMP 01/13/2018   SpO2 96%   BMI 27.70 kg/m²  Body mass index is 27.7 kg/m².    Physical Exam  Constitutional:       Appearance: Normal appearance.   Eyes:      General: Lids are normal. Vision grossly intact.      Extraocular Movements:      Right eye: Normal extraocular motion.      Left eye: Normal extraocular motion.   Cardiovascular:      Rate and Rhythm: Normal rate.   Pulmonary:      Effort: Pulmonary effort is normal.   Abdominal:      General: Abdomen is flat.   Skin:     General: Skin is warm and dry.      Capillary Refill: Capillary refill takes less than 2 seconds.   Neurological:      General: No focal deficit present.      Mental Status: She is alert and oriented to person, place, and time.      Cranial Nerves: Cranial nerves 2-12 are intact.      Sensory: Sensation is intact.             Results      Assessment & Plan:     1. Bell's palsy        2. Stress incontinence        3. Thyromegaly            Assessment & Plan  1. Stress incontinence.  She was advised to discuss with her urologist about Bulkamid injections.  Continue the oxybutynin    2.  Thyromegaly  Her thyroid function looks normal. I will u/s her thyroid in 03/2025 as recommended.    3.  Bell's palsy  She was provided with a work note to return to her regular schedule without restrictions.    4. Health maintenance.  She does not need HIV testing as long as her risk factors stay the " same.     Follow-up  The patient will follow up in 10/2024 for her annual exam.        I have placed the below orders and discussed them with an approved delegating provider. The MA is performing the below orders under the direction of Dr. Bell.      Return in about 6 months (around 9/12/2024), or if symptoms worsen or fail to improve, for Annual Physical.    Please note that this dictation was created using voice recognition software. I have made every reasonable attempt to correct obvious errors, but I expect that there are errors of grammar and possibly content that I did not discover before finalizing the note.

## 2024-03-12 NOTE — LETTER
March 12, 2024        Palak Brunner  [address] [address]        To Whom it May Concern:    Palak is cleared to return to her regular schedule without restrictions on 3/19/2024.    If you have any questions or concerns, please don't hesitate to call.        Sincerely,        Nicolas Jain A.P.R.N.    Electronically Signed

## 2024-03-20 ENCOUNTER — OFFICE VISIT (OUTPATIENT)
Dept: UROLOGY | Facility: MEDICAL CENTER | Age: 54
End: 2024-03-20
Payer: COMMERCIAL

## 2024-03-20 VITALS
OXYGEN SATURATION: 95 % | HEART RATE: 102 BPM | HEIGHT: 64 IN | DIASTOLIC BLOOD PRESSURE: 74 MMHG | TEMPERATURE: 97.9 F | SYSTOLIC BLOOD PRESSURE: 105 MMHG | WEIGHT: 158.4 LBS | BODY MASS INDEX: 27.04 KG/M2

## 2024-03-20 DIAGNOSIS — N39.3 FEMALE STRESS INCONTINENCE: ICD-10-CM

## 2024-03-20 LAB
POC POST-VOID: 69 ML
POC PRE-VOID: 246 ML

## 2024-03-20 PROCEDURE — 3078F DIAST BP <80 MM HG: CPT | Performed by: STUDENT IN AN ORGANIZED HEALTH CARE EDUCATION/TRAINING PROGRAM

## 2024-03-20 PROCEDURE — 51798 US URINE CAPACITY MEASURE: CPT | Performed by: STUDENT IN AN ORGANIZED HEALTH CARE EDUCATION/TRAINING PROGRAM

## 2024-03-20 PROCEDURE — 99214 OFFICE O/P EST MOD 30 MIN: CPT | Performed by: STUDENT IN AN ORGANIZED HEALTH CARE EDUCATION/TRAINING PROGRAM

## 2024-03-20 PROCEDURE — 3074F SYST BP LT 130 MM HG: CPT | Performed by: STUDENT IN AN ORGANIZED HEALTH CARE EDUCATION/TRAINING PROGRAM

## 2024-03-20 RX ORDER — TROSPIUM CHLORIDE ER 60 MG/1
60 CAPSULE ORAL DAILY
Qty: 30 CAPSULE | Refills: 3 | Status: SHIPPED | OUTPATIENT
Start: 2024-03-20

## 2024-03-20 ASSESSMENT — FIBROSIS 4 INDEX: FIB4 SCORE: 0.98

## 2024-03-20 NOTE — PROGRESS NOTES
Subjective  Palak Brunner is a 53 y.o. female who presents today for follow up of mixed incontinence. She is still leaking with cough and sneeze. When she exercises she leaks approximately 50% less. Still urinating every two hours, feeling a strong urge to go. She did not have symptomatic improvement with oxybutynin.        Family History   Problem Relation Age of Onset    Diabetes Neg Hx     Heart Disease Neg Hx     Cancer Neg Hx     Stroke Neg Hx        Social History     Socioeconomic History    Marital status:      Spouse name: Not on file    Number of children: Not on file    Years of education: Not on file    Highest education level: Not on file   Occupational History    Not on file   Tobacco Use    Smoking status: Never    Smokeless tobacco: Never    Tobacco comments:     aviod all tobacco products   Vaping Use    Vaping Use: Never used   Substance and Sexual Activity    Alcohol use: Not Currently     Comment: occasionally    Drug use: No    Sexual activity: Yes     Partners: Male     Birth control/protection: Pill   Other Topics Concern    Not on file   Social History Narrative    Not on file     Social Determinants of Health     Financial Resource Strain: Not on file   Food Insecurity: Not on file   Transportation Needs: Not on file   Physical Activity: Not on file   Stress: Not on file   Social Connections: Not on file   Intimate Partner Violence: Not on file   Housing Stability: Not on file       Past Surgical History:   Procedure Laterality Date    CYSTOSCOPY WITH COLLAGEN N/A 1/12/2024    Procedure: CYSTOSCOPY INJECTION OF POLYARYLAMIDE HYDROGEL;  Surgeon: Sanam Nascimento M.D.;  [facility] SURGERY [facility];  Service: Urology    LASER ABLATION Left 06/10/2022    left greater saphenous vein    LESION EXCISION GENERAL Left 12/18/2017    Procedure: LESION EXCISION GENERAL - FOR RE-EXCISION UPPER ARM 2CM NEOPLASTIC SKIN LESION;  Surgeon: Wesley Corrigan M.D.;  Location: SURGERY [facility]  "DAY [facility];  Service: General    HEMORRHOIDECTOMY  06/22/2017    Procedure: HEMORRHOIDECTOMY FOR EXCISION EXTERNAL HEMORRHOID/SKIN TAG;  Surgeon: Wesley Corrigan M.D.;  Location: SURGERY SAME DAY [facility];  Service:     CHOLECYSTECTOMY      PRIMARY C SECTION      US-CYST ASPIRATION-BREAST INITIAL         Past Medical History:   Diagnosis Date    Bilateral breast cysts 06/01/2011    Present for 10-12 years    Gastritis 11/20/2014    Psychiatric problem     Urinary incontinence 06/01/2017       Current Outpatient Medications   Medication Sig Dispense Refill    oxybutynin SR (DITROPAN-XL) 10 MG CR tablet Take 1 Tablet by mouth every day. 30 Tablet 3    phenazopyridine (PYRIDIUM) 200 MG Tab Take 1 Tablet by mouth 3 times a day as needed for Mild Pain. 9 Tablet 0    Nutritional Supplements (ESTROVEN PO) Take 1 Capsule by mouth every day.      omeprazole (PRILOSEC) 20 MG delayed-release capsule Take 20 mg by mouth every day.      aspirin (ASA) 325 MG Tab Take 325 mg by mouth every 6 hours as needed for Mild Pain.      estradiol (ESTRACE) 0.1 MG/GM vaginal cream Please apply the amount the size of a pea to vagina every day for one month.  Then apply every Monday, Wednesday, Friday 42.5 g 3     No current facility-administered medications for this visit.       No Known Allergies    Objective  /74   Pulse (!) 102   Temp 36.6 °C (97.9 °F) (Temporal)   Ht 1.626 m (5' 4\")   Wt 71.8 kg (158 lb 6.4 oz)   SpO2 95%   Physical Exam  Constitutional:       Appearance: Normal appearance.   HENT:      Head: Normocephalic and atraumatic.   Eyes:      Pupils: Pupils are equal, round, and reactive to light.   Pulmonary:      Effort: No respiratory distress.   Skin:     General: Skin is warm and dry.   Neurological:      General: No focal deficit present.      Mental Status: She is alert.   Psychiatric:         Mood and Affect: Mood normal.         Behavior: Behavior normal.         Labs:     none    Imaging: "     none    Assessment    Stress Incontinence  -Discussed repeat bulkamid to increase the size of the cushions vs mid urethral sling. She would like to proceed with  repeat bulkamid injection    OAB, urge incontinence  -We will trial trospium since she did not have good symptomatic control with oxybutynin    Plan    1. Female stress incontinence  - POCT Bladder Scan    Will contact to schedule OR

## 2024-03-21 ENCOUNTER — HOSPITAL ENCOUNTER (OUTPATIENT)
Facility: MEDICAL CENTER | Age: 54
End: 2024-03-21
Attending: STUDENT IN AN ORGANIZED HEALTH CARE EDUCATION/TRAINING PROGRAM | Admitting: STUDENT IN AN ORGANIZED HEALTH CARE EDUCATION/TRAINING PROGRAM
Payer: COMMERCIAL

## 2024-04-08 ENCOUNTER — TELEPHONE (OUTPATIENT)
Dept: UROLOGY | Facility: MEDICAL CENTER | Age: 54
End: 2024-04-08
Payer: COMMERCIAL

## 2024-05-24 NOTE — OP THERAPY DAILY TREATMENT
Outpatient Physical Therapy  PELVIC FLOOR DAILY TREATMENT     [facility] Outpatient Physical Therapy  [address]  [place] [address]  Phone:  [phone]  Fax:  [phone]    Date: 12/06/2017    Patient: Palak Lopes  YOB: 1970  MRN: [id number]     Time Calculation  Start time: 0345  Stop time: 0430 Time Calculation (min): 45 minutes     Chief Complaint: Incontinence    Visit #: 3    Subjective She already has less urinary leakage and is pleased with her results.      Objective   Informed consent for internal examination given      Exercises and Treatments   Therapeutic treatments and modalities     Manual therapy, 45 min    Manual therapy external, CTM suprapubic region, scar mobs    Manual therapy internal, TrP levator ani, OI, introitus         Assessment, Goals and Plan   Assessment:     Assessment details:  Palak is making good progress with PT and demonstrates improved scar tissue mobility from moderate restriction to mild at L distal end of scar. She demonstrates decreased PFM overactivity from moderate to mild.     Treatment Plan:     Planned therapy interventions:  Manual therapy (CPT 28807) and neuromuscular re-education (CPT 23163)    Frequency of visits:  2x week    Plan details:  Continue PT        
no

## 2024-06-25 ENCOUNTER — OFFICE VISIT (OUTPATIENT)
Dept: MEDICAL GROUP | Facility: PHYSICIAN GROUP | Age: 54
End: 2024-06-25
Payer: COMMERCIAL

## 2024-06-25 VITALS
TEMPERATURE: 97.9 F | SYSTOLIC BLOOD PRESSURE: 120 MMHG | HEIGHT: 64 IN | HEART RATE: 88 BPM | WEIGHT: 160 LBS | DIASTOLIC BLOOD PRESSURE: 70 MMHG | BODY MASS INDEX: 27.31 KG/M2 | OXYGEN SATURATION: 98 %

## 2024-06-25 DIAGNOSIS — H00.015 HORDEOLUM EXTERNUM OF LEFT LOWER EYELID: ICD-10-CM

## 2024-06-25 DIAGNOSIS — F33.0 MILD EPISODE OF RECURRENT MAJOR DEPRESSIVE DISORDER (HCC): ICD-10-CM

## 2024-06-25 DIAGNOSIS — H02.30 EYELID EXCESS SKIN, UNSPECIFIED LATERALITY: ICD-10-CM

## 2024-06-25 PROCEDURE — 3074F SYST BP LT 130 MM HG: CPT | Performed by: NURSE PRACTITIONER

## 2024-06-25 PROCEDURE — 3078F DIAST BP <80 MM HG: CPT | Performed by: NURSE PRACTITIONER

## 2024-06-25 PROCEDURE — 99213 OFFICE O/P EST LOW 20 MIN: CPT | Performed by: NURSE PRACTITIONER

## 2024-06-25 ASSESSMENT — PATIENT HEALTH QUESTIONNAIRE - PHQ9
3. TROUBLE FALLING OR STAYING ASLEEP OR SLEEPING TOO MUCH: SEVERAL DAYS
5. POOR APPETITE OR OVEREATING: NOT AT ALL
6. FEELING BAD ABOUT YOURSELF - OR THAT YOU ARE A FAILURE OR HAVE LET YOURSELF OR YOUR FAMILY DOWN: SEVERAL DAYS
1. LITTLE INTEREST OR PLEASURE IN DOING THINGS: NOT AT ALL
4. FEELING TIRED OR HAVING LITTLE ENERGY: SEVERAL DAYS
7. TROUBLE CONCENTRATING ON THINGS, SUCH AS READING THE NEWSPAPER OR WATCHING TELEVISION: NOT AT ALL
8. MOVING OR SPEAKING SO SLOWLY THAT OTHER PEOPLE COULD HAVE NOTICED. OR THE OPPOSITE, BEING SO FIGETY OR RESTLESS THAT YOU HAVE BEEN MOVING AROUND A LOT MORE THAN USUAL: NOT AT ALL
SUM OF ALL RESPONSES TO PHQ QUESTIONS 1-9: 4
9. THOUGHTS THAT YOU WOULD BE BETTER OFF DEAD, OR OF HURTING YOURSELF: NOT AT ALL
2. FEELING DOWN, DEPRESSED, IRRITABLE, OR HOPELESS: SEVERAL DAYS
SUM OF ALL RESPONSES TO PHQ9 QUESTIONS 1 AND 2: 1

## 2024-06-25 ASSESSMENT — FIBROSIS 4 INDEX: FIB4 SCORE: 0.98

## 2024-06-25 NOTE — PROGRESS NOTES
Verbal consent was acquired by the patient to use RRsat ambient listening note generation during this visit yes    Subjective:     HPI:   Palak is a 53 y.o.female established patient who presents today for:    History of Present Illness  The patient presents for evaluation of multiple medical concerns.    The patient reports a 50 to 60 percent improvement in her condition compared to the previous weekend. She has been managing the condition with warm compresses and ice. Initially, the stye was localized to the right eye, but has since progressed to the left eye. After a week and a half, she nearly fully recovered, but the stye recurred. She experiences minor movement in the stye, which subsequently swells, extending to the eye. The stye was associated with pain, redness, and a small pimple on the edge of her eye, but it is currently showing signs of improvement. The stye has been present on the left eye for the past 5 days, accompanied by itching. She denies any specific exposure to dust or particles in her eye. She does not regularly use mascara. Her face is washed with soap due to dry skin, and she recently started using a new eye cream. She avoids touching her face if her hands are not completely clean. Her eyes are swollen in the morning, making it difficult for her vision.    The patient reports occasional feelings of depression or hopelessness. She experiences occasional insomnia. She denies any difficulty concentrating or thoughts of self-harm.    Depression Screening    Little interest or pleasure in doing things?   Not at all  Feeling down, depressed , or hopeless?  Several days  Trouble falling or staying asleep, or sleeping too much?   Several days  Feeling tired or having little energy?   Several days  Poor appetite or overeating?   Not at all  Feeling bad about yourself - or that you are a failure or have let yourself or your family down?  Several days  Trouble concentrating on things, such as  reading the newspaper or watching television?  Not at all  Moving or speaking so slowly that other people could have noticed.  Or the opposite - being so fidgety or restless that you have been moving around a lot more than usual?   Not at all  Thoughts that you would be better off dead, or of hurting yourself?   Not at all  Patient Health Questionnaire Score:  4      If depressive symptoms identified deferred to follow up visit unless specifically addressed in assesment and plan.    Interpretation of PHQ-9 Total Score   Score Severity   1-4 No Depression   5-9 Mild Depression   10-14 Moderate Depression   15-19 Moderately Severe Depression   20-27 Severe Depression      Her menopause symptoms have improved. She has been taking Estroven and [place], which seems to be helping.     has a past medical history of Bilateral breast cysts (06/01/2011), Gastritis (11/20/2014), Psychiatric problem, and Urinary incontinence (06/01/2017).    She has no past medical history of Breast cancer (HCC).   has a past surgical history that includes cholecystectomy; primary c section; US-CYST ASPIRATION-BREAST INITIAL; hemorrhoidectomy (06/22/2017); lesion excision general (Left, 12/18/2017); laser ablation (Left, 06/10/2022); and cystoscopy with collagen (N/A, 1/12/2024).    Family History   Problem Relation Age of Onset    Diabetes Neg Hx     Heart Disease Neg Hx     Cancer Neg Hx     Stroke Neg Hx        Social History     Socioeconomic History    Marital status:      Spouse name: Not on file    Number of children: Not on file    Years of education: Not on file    Highest education level: Not on file   Occupational History    Not on file   Tobacco Use    Smoking status: Never    Smokeless tobacco: Never    Tobacco comments:     aviod all tobacco products   Vaping Use    Vaping status: Never Used   Substance and Sexual Activity    Alcohol use: Not Currently     Comment: occasionally    Drug use: No    Sexual activity: Yes      Partners: Male     Birth control/protection: Pill   Other Topics Concern    Not on file   Social History Narrative    Not on file     Social Determinants of Health     Financial Resource Strain: Not on file   Food Insecurity: Not on file   Transportation Needs: Not on file   Physical Activity: Not on file   Stress: Not on file   Social Connections: Not on file   Intimate Partner Violence: Not on file   Housing Stability: Not on file       Patient Active Problem List    Diagnosis Date Noted    Bell's palsy 01/15/2024    Female stress incontinence 12/14/2023    Major depressive disorder 07/10/2023    Acute bilateral knee pain 07/10/2023    Thyromegaly 03/07/2023    Shoulder pain, right 03/07/2023    Dry eyes 03/07/2023    Menopause 06/15/2021    Joint pain 06/15/2021    Keloid scar 11/30/2018    Seborrheic keratoses 06/12/2018    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 30 Capsule 3    oxybutynin SR (DITROPAN-XL) 10 MG CR tablet Take 1 Tablet by mouth every day. 30 Tablet 3    phenazopyridine (PYRIDIUM) 200 MG Tab Take 1 Tablet by mouth 3 times a day as needed for Mild Pain. 9 Tablet 0    Nutritional Supplements (ESTROVEN PO) Take 1 Capsule by mouth every day.      omeprazole (PRILOSEC) 20 MG delayed-release capsule Take 20 mg by mouth every day.      aspirin (ASA) 325 MG Tab Take 325 mg by mouth every 6 hours as needed for Mild Pain.      estradiol (ESTRACE) 0.1 MG/GM vaginal cream Please apply the amount the size of a pea to vagina every day for one month.  Then apply every Monday, Wednesday, Friday 42.5 g 3     No current [facility]-ordered facility-administered medications on file.     No Known Allergies    Health  "Maintenance: Completed    Objective:     Exam:  /70 (BP Location: Left arm, Patient Position: Sitting, BP Cuff Size: Adult)   Pulse 88   Temp 36.6 °C (97.9 °F) (Temporal)   Ht 1.626 m (5' 4\")   Wt 72.6 kg (160 lb)   LMP 01/13/2018   SpO2 98%   BMI 27.46 kg/m²  Body mass index is 27.46 kg/m².    Physical Exam  Constitutional:       Appearance: Normal appearance.   HENT:      Head: Normocephalic and atraumatic.   Cardiovascular:      Rate and Rhythm: Normal rate and regular rhythm.      Pulses: Normal pulses.      Heart sounds: Normal heart sounds.   Pulmonary:      Effort: Pulmonary effort is normal.      Breath sounds: Normal breath sounds.   Skin:     General: Skin is warm and dry.   Neurological:      General: No focal deficit present.      Mental Status: She is alert and oriented to person, place, and time.             Results      Assessment & Plan:     1. Hordeolum externum of left lower eyelid        2. Mild episode of recurrent major depressive disorder (HCC)        3. Eyelid excess skin, unspecified laterality            Assessment & Plan  1. Stye.  The patient was advised to persist with warm compresses. It was suggested that she replace her old eye makeup with an eyelinliner or mascara. The complete resolution of the stye is expected in approximately 2 weeks. Should the stye persist for 3 to 4 weeks, a referral to a specialist for potential drainage will be necessary.    2. Depression.  The patient's depression symptoms have improved. The patient was advised to consider the Calm magnesium powder.  Patient states this is improving as such she is not interested in medication at this time.    3.  Menopausal symptoms  Overall improved at this time we will continue to monitor    I have placed the below orders and discussed them with an approved delegating provider. The MA is performing the below orders under the direction of Dr. Bell.      Return if symptoms worsen or fail to improve.    Please " note that this dictation was created using voice recognition software. I have made every reasonable attempt to correct obvious errors, but I expect that there are errors of grammar and possibly content that I did not discover before finalizing the note.

## 2024-06-25 NOTE — LETTER
Luv Rink  MARY RossiP.RRADHA  [address]  [place] [address]  Fax: [phone]   Authorization for Release/Disclosure of   Protected Health Information   Name: AVRIL VACA : 1970 SSN: [id number]   Address: [address] [place] [address] [address] [facility]  [place] [address] Phone:    [phone] (home)    I authorize the entity listed below to release/disclose the PHI below to:   Luv Rink/Nicolas Jain A.P.R.HIMANSHU. and ASHLEY Rossi.P.RRADHA   Provider or Entity Name:  [facility] [employer]   Address   City, State, Zip   Phone:      Fax:     Reason for request: continuity of care   Information to be released:    [  ] LAST COLONOSCOPY,  including any PATH REPORT and follow-up  [  ] LAST FIT/COLOGUARD RESULT [  ] LAST DEXA  [  ] LAST MAMMOGRAM  [  ] LAST PAP  [  ] LAST LABS [  ] RETINA EXAM REPORT  [  ] IMMUNIZATION RECORDS  [  ] Release all info      [  ] Check here and initial the line next to each item to release ALL health information INCLUDING  _____ Care and treatment for drug and / or alcohol abuse  _____ HIV testing, infection status, or AIDS  _____ Genetic Testing    DATES OF SERVICE OR TIME PERIOD TO BE DISCLOSED: _____________  I understand and acknowledge that:  * This Authorization may be revoked at any time by you in writing, except if your health information has already been used or disclosed.  * Your health information that will be used or disclosed as a result of you signing this authorization could be re-disclosed by the recipient. If this occurs, your re-disclosed health information may no longer be protected by State or Federal laws.  * You may refuse to sign this Authorization. Your refusal will not affect your ability to obtain treatment.  * This Authorization becomes effective upon signing and will  on (date) __________.      If no date is indicated, this Authorization will  one (1) year from the signature date.    Name:  Palak Brunner  Signature: Date:   6/25/2024     PLEASE FAX REQUESTED RECORDS BACK TO: [phone]

## 2024-06-25 NOTE — LETTER
June 25, 2024        Palak Brunner  [address] [facility] [address]        To Whom it May Concern:    Please excuse patient from work 6/25/24 related to necessary medical appointment.    If you have any questions or concerns, please don't hesitate to call.        Sincerely,        ROCK Rossi.    Electronically Signed

## 2024-10-24 ENCOUNTER — APPOINTMENT (OUTPATIENT)
Dept: MEDICAL GROUP | Facility: PHYSICIAN GROUP | Age: 54
End: 2024-10-24
Payer: COMMERCIAL

## 2024-10-28 ENCOUNTER — APPOINTMENT (OUTPATIENT)
Dept: MEDICAL GROUP | Facility: PHYSICIAN GROUP | Age: 54
End: 2024-10-28
Payer: COMMERCIAL

## 2024-12-09 ENCOUNTER — TELEPHONE (OUTPATIENT)
Dept: UROLOGY | Facility: MEDICAL CENTER | Age: 54
End: 2024-12-09
Payer: COMMERCIAL

## 2024-12-09 DIAGNOSIS — N39.3 FEMALE STRESS INCONTINENCE: ICD-10-CM

## 2024-12-09 NOTE — TELEPHONE ENCOUNTER
----- Message from SHEYLA SAINZ sent at 12/9/2024  2:52 PM PST -----  Regarding: Continuum of Care  Place continuum of care referral for future visits already scheduled  - please and thank you

## 2024-12-12 ENCOUNTER — TELEPHONE (OUTPATIENT)
Dept: UROLOGY | Facility: MEDICAL CENTER | Age: 54
End: 2024-12-12
Payer: COMMERCIAL

## 2024-12-13 ENCOUNTER — APPOINTMENT (OUTPATIENT)
Dept: UROLOGY | Facility: MEDICAL CENTER | Age: 54
End: 2024-12-13
Attending: NURSE PRACTITIONER
Payer: COMMERCIAL

## 2024-12-16 ENCOUNTER — APPOINTMENT (OUTPATIENT)
Dept: MEDICAL GROUP | Facility: PHYSICIAN GROUP | Age: 54
End: 2024-12-16
Payer: COMMERCIAL

## 2024-12-16 VITALS
HEIGHT: 64 IN | BODY MASS INDEX: 28.65 KG/M2 | WEIGHT: 167.8 LBS | DIASTOLIC BLOOD PRESSURE: 70 MMHG | TEMPERATURE: 97.8 F | OXYGEN SATURATION: 98 % | HEART RATE: 90 BPM | SYSTOLIC BLOOD PRESSURE: 120 MMHG

## 2024-12-16 DIAGNOSIS — Z78.0 MENOPAUSE: ICD-10-CM

## 2024-12-16 DIAGNOSIS — E01.0 THYROMEGALY: ICD-10-CM

## 2024-12-16 DIAGNOSIS — Z00.00 WELLNESS EXAMINATION: ICD-10-CM

## 2024-12-16 DIAGNOSIS — Z12.31 ENCOUNTER FOR SCREENING MAMMOGRAM FOR MALIGNANT NEOPLASM OF BREAST: ICD-10-CM

## 2024-12-16 DIAGNOSIS — Z01.84 IMMUNITY STATUS TESTING: ICD-10-CM

## 2024-12-16 DIAGNOSIS — Z11.59 NEED FOR HEPATITIS C SCREENING TEST: ICD-10-CM

## 2024-12-16 DIAGNOSIS — N39.3 FEMALE STRESS INCONTINENCE: ICD-10-CM

## 2024-12-16 DIAGNOSIS — Z12.11 SCREENING FOR COLON CANCER: ICD-10-CM

## 2024-12-16 PROCEDURE — 99396 PREV VISIT EST AGE 40-64: CPT | Performed by: NURSE PRACTITIONER

## 2024-12-16 PROCEDURE — 3074F SYST BP LT 130 MM HG: CPT | Performed by: NURSE PRACTITIONER

## 2024-12-16 PROCEDURE — 3078F DIAST BP <80 MM HG: CPT | Performed by: NURSE PRACTITIONER

## 2024-12-16 ASSESSMENT — FIBROSIS 4 INDEX: FIB4 SCORE: 1

## 2024-12-16 NOTE — PROGRESS NOTES
Subjective:     CC:   Chief Complaint   Patient presents with    Annual Exam       HPI:   Palak Brunner is a 54 y.o. female who presents for annual exam. She is feeling well and denies any complaints.    Ob-Gyn/ History:    Patient has GYN provider: no  /Para:    Last Pap Smear:  . no history of abnormal pap smears.  Gyn Surgery:  .  Current Contraceptive Method:  none. is currently sexually active.  Last menstrual period:  2018.    No significant bloating/fluid retention, pelvic pain, or dyspareunia. No vaginal discharge  Post-menopausal bleeding: none  Urinary incontinence: sees urology    Health Maintenance  Advanced directive: up to date   Osteoporosis Screen/ DEXA: not needed   Cholesterol Screening: ordered   Diabetes Screening: up to date     Anticipatory Guidance  Diet: states overall    Exercise: +walking   Substance Abuse: no concern   Safe in relationship.   Seat belts, bike helmet, gun safety discussed.  Sun protection used.    Cancer screening  Colorectal Cancer Screening: cologuard    Cervical Cancer Screening: up to date   Breast Cancer Screening: ordered     Infectious disease screening/Immunizations  --STI Screening: declines   --Practices safe sex.  --HIV Screening: up to date   --Hepatitis C Screening: ordered    --Immunizations: up to date     She  has a past medical history of Bilateral breast cysts (2011), Gastritis (2014), Psychiatric problem, and Urinary incontinence (2017).    She has no past medical history of Breast cancer (HCC).  She  has a past surgical history that includes cholecystectomy; primary c section; US-CYST ASPIRATION-BREAST INITIAL; hemorrhoidectomy (2017); lesion excision general (Left, 2017); laser ablation (Left, 06/10/2022); and cystoscopy with collagen (N/A, 2024).    Family History   Problem Relation Age of Onset    Diabetes Neg Hx     Heart Disease Neg Hx     Cancer Neg Hx     Stroke Neg Hx         Social History     Socioeconomic History    Marital status:      Spouse name: Not on file    Number of children: Not on file    Years of education: Not on file    Highest education level: Not on file   Occupational History    Not on file   Tobacco Use    Smoking status: Never    Smokeless tobacco: Never    Tobacco comments:     aviod all tobacco products   Vaping Use    Vaping status: Never Used   Substance and Sexual Activity    Alcohol use: Not Currently     Comment: occasionally    Drug use: No    Sexual activity: Yes     Partners: Male     Birth control/protection: Pill   Other Topics Concern    Not on file   Social History Narrative    Not on file     Social Drivers of Health     Financial Resource Strain: Not on file   Food Insecurity: Not on file   Transportation Needs: Not on file   Physical Activity: Not on file   Stress: Not on file   Social Connections: Not on file   Intimate Partner Violence: Not on file   Housing Stability: Not on file       Patient Active Problem List    Diagnosis Date Noted    Bell's palsy 01/15/2024    Female stress incontinence 12/14/2023    Major depressive disorder 07/10/2023    Acute bilateral knee pain 07/10/2023    Thyromegaly 03/07/2023    Shoulder pain, right 03/07/2023    Dry eyes 03/07/2023    Menopause 06/15/2021    Joint pain 06/15/2021    Keloid scar 11/30/2018    Seborrheic keratoses 06/12/2018    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  Then apply every Monday, Wednesday, Friday 42.5 g 3     No current facility-administered medications for this visit.     No Known Allergies    Review of Systems   Constitutional: Negative for fever, chills  "and malaise/fatigue.   HENT: Negative for congestion.    Eyes: Negative for pain.    Respiratory: Negative for cough and shortness of breath.  Cardiovascular: Negative for leg swelling.   Gastrointestinal: Negative for nausea, vomiting, abdominal pain and diarrhea.   Genitourinary: Negative for dysuria and hematuria.   Skin: Negative for rash.   Neurological: Negative for dizziness, focal weakness and headaches.   Endo/Heme/Allergies: Does not bleed easily.   Psychiatric/Behavioral: Negative for depression.  The patient is not nervous/anxious.      Objective:     /70 (BP Location: Left arm, Patient Position: Sitting, BP Cuff Size: Adult)   Pulse 90   Temp 36.6 °C (97.8 °F) (Temporal)   Ht 1.626 m (5' 4\")   Wt 76.1 kg (167 lb 12.8 oz)   LMP 01/13/2018   SpO2 98%   BMI 28.80 kg/m²   Body mass index is 28.8 kg/m².  Wt Readings from Last 4 Encounters:   12/16/24 76.1 kg (167 lb 12.8 oz)   06/25/24 72.6 kg (160 lb)   03/20/24 71.8 kg (158 lb 6.4 oz)   03/12/24 73.2 kg (161 lb 6.4 oz)       Physical Exam:  Constitutional: Well-developed and well-nourished. Not diaphoretic. No distress.   Skin: Skin is warm and dry. No rash noted.  Head: Atraumatic without lesions.  Eyes: Conjunctivae and extraocular motions are normal. Pupils are equal, round, and reactive to light. No scleral icterus.   Ears:  External ears unremarkable. Tympanic membranes clear and intact.  Nose: Nares patent. Septum midline. Turbinates without erythema nor edema. No discharge.   Mouth/Throat: Dentition is WNL. Tongue normal. Oropharynx is clear and moist. Posterior pharynx without erythema or exudates.  Neck: Supple, trachea midline. Normal range of motion. No thyromegaly present. No lymphadenopathy--cervical or supraclavicular.  Cardiovascular: Regular rate and rhythm, S1 and S2 without murmur, rubs, or gallops.  Lungs: Normal inspiratory effort, CTA bilaterally, no wheezes/rhonchi/rales  Breast: deferred  Abdomen: Soft, non tender, and " without distention. Active bowel sounds in all four quadrants. No rebound, guarding, masses or HSM.  :deferred  Extremities: No cyanosis, clubbing, erythema, nor edema. Distal pulses intact and symmetric.   Musculoskeletal: All major joints AROM full in all directions without pain.  Neurological: Alert and oriented x 3. DTRs 2+/3 and symmetric. No cranial nerve deficit. 5/5 myotomes. Sensation intact.   Psychiatric:  Behavior, mood, and affect are appropriate.      Assessment and Plan:     1. Screening for colon cancer        2. Immunity status testing        3. Wellness examination        4. Need for hepatitis C screening test        5. Encounter for screening mammogram for malignant neoplasm of breast        6. Thyromegaly            HCM:     Labs per orders  Immunizations per orders  Patient counseled about skin care, diet, supplements, prenatal vitamins, safe sex and exercise.      Follow-up: Return in about 1 year (around 12/16/2025).

## 2024-12-16 NOTE — LETTER
December 16, 2024        Palak Brunner  [address] [facility] [address]        To Whom it May Concern:    Please excuse Palak from work due to doctor's appointment.     If you have any questions or concerns, please don't hesitate to call.        Sincerely,        ROCK Rossi.    Electronically Signed

## 2025-01-04 ENCOUNTER — HOSPITAL ENCOUNTER (OUTPATIENT)
Dept: LAB | Facility: MEDICAL CENTER | Age: 55
End: 2025-01-04
Attending: NURSE PRACTITIONER
Payer: COMMERCIAL

## 2025-01-04 DIAGNOSIS — Z01.84 IMMUNITY STATUS TESTING: ICD-10-CM

## 2025-01-04 DIAGNOSIS — Z11.59 NEED FOR HEPATITIS C SCREENING TEST: ICD-10-CM

## 2025-01-04 DIAGNOSIS — E01.0 THYROMEGALY: ICD-10-CM

## 2025-01-04 DIAGNOSIS — Z00.00 WELLNESS EXAMINATION: ICD-10-CM

## 2025-01-04 LAB
ALBUMIN SERPL BCP-MCNC: 4.4 G/DL (ref 3.2–4.9)
ALBUMIN/GLOB SERPL: 1.3 G/DL
ALP SERPL-CCNC: 76 U/L (ref 30–99)
ALT SERPL-CCNC: 22 U/L (ref 2–50)
ANION GAP SERPL CALC-SCNC: 9 MMOL/L (ref 7–16)
AST SERPL-CCNC: 20 U/L (ref 12–45)
BASOPHILS # BLD AUTO: 0.7 % (ref 0–1.8)
BASOPHILS # BLD: 0.04 K/UL (ref 0–0.12)
BILIRUB SERPL-MCNC: 1 MG/DL (ref 0.1–1.5)
BUN SERPL-MCNC: 10 MG/DL (ref 8–22)
CALCIUM ALBUM COR SERPL-MCNC: 9 MG/DL (ref 8.5–10.5)
CALCIUM SERPL-MCNC: 9.3 MG/DL (ref 8.5–10.5)
CHLORIDE SERPL-SCNC: 103 MMOL/L (ref 96–112)
CHOLEST SERPL-MCNC: 192 MG/DL (ref 100–199)
CO2 SERPL-SCNC: 28 MMOL/L (ref 20–33)
CREAT SERPL-MCNC: 0.57 MG/DL (ref 0.5–1.4)
EOSINOPHIL # BLD AUTO: 0.13 K/UL (ref 0–0.51)
EOSINOPHIL NFR BLD: 2.3 % (ref 0–6.9)
ERYTHROCYTE [DISTWIDTH] IN BLOOD BY AUTOMATED COUNT: 41.8 FL (ref 35.9–50)
FASTING STATUS PATIENT QL REPORTED: NORMAL
GFR SERPLBLD CREATININE-BSD FMLA CKD-EPI: 107 ML/MIN/1.73 M 2
GLOBULIN SER CALC-MCNC: 3.5 G/DL (ref 1.9–3.5)
GLUCOSE SERPL-MCNC: 98 MG/DL (ref 65–99)
HBV SURFACE AB SERPL IA-ACNC: <3.5 MIU/ML (ref 0–10)
HCT VFR BLD AUTO: 44.1 % (ref 37–47)
HCV AB SER QL: NONREACTIVE
HDLC SERPL-MCNC: 45 MG/DL
HGB BLD-MCNC: 14.8 G/DL (ref 12–16)
IMM GRANULOCYTES # BLD AUTO: 0.02 K/UL (ref 0–0.11)
IMM GRANULOCYTES NFR BLD AUTO: 0.4 % (ref 0–0.9)
LDLC SERPL CALC-MCNC: 125 MG/DL
LYMPHOCYTES # BLD AUTO: 1.86 K/UL (ref 1–4.8)
LYMPHOCYTES NFR BLD: 32.8 % (ref 22–41)
MCH RBC QN AUTO: 30.6 PG (ref 27–33)
MCHC RBC AUTO-ENTMCNC: 33.6 G/DL (ref 32.2–35.5)
MCV RBC AUTO: 91.3 FL (ref 81.4–97.8)
MONOCYTES # BLD AUTO: 0.51 K/UL (ref 0–0.85)
MONOCYTES NFR BLD AUTO: 9 % (ref 0–13.4)
NEUTROPHILS # BLD AUTO: 3.11 K/UL (ref 1.82–7.42)
NEUTROPHILS NFR BLD: 54.8 % (ref 44–72)
NRBC # BLD AUTO: 0 K/UL
NRBC BLD-RTO: 0 /100 WBC (ref 0–0.2)
PLATELET # BLD AUTO: 271 K/UL (ref 164–446)
PMV BLD AUTO: 10.8 FL (ref 9–12.9)
POTASSIUM SERPL-SCNC: 4.6 MMOL/L (ref 3.6–5.5)
PROT SERPL-MCNC: 7.9 G/DL (ref 6–8.2)
RBC # BLD AUTO: 4.83 M/UL (ref 4.2–5.4)
SODIUM SERPL-SCNC: 140 MMOL/L (ref 135–145)
TRIGL SERPL-MCNC: 111 MG/DL (ref 0–149)
TSH SERPL DL<=0.005 MIU/L-ACNC: 1.33 UIU/ML (ref 0.38–5.33)
WBC # BLD AUTO: 5.7 K/UL (ref 4.8–10.8)

## 2025-01-04 PROCEDURE — 80053 COMPREHEN METABOLIC PANEL: CPT

## 2025-01-04 PROCEDURE — 86803 HEPATITIS C AB TEST: CPT

## 2025-01-04 PROCEDURE — 85025 COMPLETE CBC W/AUTO DIFF WBC: CPT

## 2025-01-04 PROCEDURE — 86706 HEP B SURFACE ANTIBODY: CPT

## 2025-01-04 PROCEDURE — 36415 COLL VENOUS BLD VENIPUNCTURE: CPT

## 2025-01-04 PROCEDURE — 80061 LIPID PANEL: CPT

## 2025-01-04 PROCEDURE — 84443 ASSAY THYROID STIM HORMONE: CPT

## 2025-01-07 ENCOUNTER — APPOINTMENT (OUTPATIENT)
Dept: MEDICAL GROUP | Facility: PHYSICIAN GROUP | Age: 55
End: 2025-01-07
Payer: COMMERCIAL

## 2025-01-17 ENCOUNTER — HOSPITAL ENCOUNTER (OUTPATIENT)
Dept: RADIOLOGY | Facility: MEDICAL CENTER | Age: 55
End: 2025-01-17
Attending: NURSE PRACTITIONER
Payer: COMMERCIAL

## 2025-01-17 DIAGNOSIS — Z12.31 ENCOUNTER FOR SCREENING MAMMOGRAM FOR MALIGNANT NEOPLASM OF BREAST: ICD-10-CM

## 2025-01-17 PROCEDURE — 77067 SCR MAMMO BI INCL CAD: CPT

## (undated) DEVICE — LACTATED RINGERS INJ 1000 ML - (14EA/CA 60CA/PF)

## (undated) DEVICE — SODIUM CHL IRRIGATION 0.9% 1000ML (12EA/CA)

## (undated) DEVICE — COVER FOOT UNIVERSAL DISP. - (25EA/CA)

## (undated) DEVICE — CANISTER SUCTION 3000ML MECHANICAL FILTER AUTO SHUTOFF MEDI-VAC NONSTERILE LF DISP  (40EA/CA)

## (undated) DEVICE — BLADE SURGICAL #15 - (50/BX 3BX/CA)

## (undated) DEVICE — PROTECTOR ULNA NERVE - (36PR/CA)

## (undated) DEVICE — CONNECTOR HOSE NEPTUNE FOR CYSTO ROOM

## (undated) DEVICE — GLOVE BIOGEL SZ 6.5 SURGICAL PF LTX (50PR/BX 4BX/CA)

## (undated) DEVICE — NEPTUNE 4 PORT MANIFOLD - (20/PK)

## (undated) DEVICE — WATER IRRIGATION STERILE 1000ML (12EA/CA)

## (undated) DEVICE — GOWN WARMING STANDARD FLEX - (30/CA)

## (undated) DEVICE — PACK MINOR BASIN - (2EA/CA)

## (undated) DEVICE — CATHETER IV 20 GA X 1-1/4 ---SURG.& SDS ONLY--- (50EA/BX)

## (undated) DEVICE — SLEEVE, VASO, THIGH, MED

## (undated) DEVICE — CHLORAPREP 26 ML APPLICATOR - ORANGE TINT(25/CA)

## (undated) DEVICE — GLOVE BIOGEL INDICATOR SZ 8.5 SURGICAL PF LTX - (50/BX 4BX/CA)

## (undated) DEVICE — CANISTER SUCTION RIGID RED 1500CC (40EA/CA)

## (undated) DEVICE — SENSOR SPO2 NEO LNCS ADHESIVE (20/BX) SEE USER NOTES

## (undated) DEVICE — MASK, LARYNGEAL AIRWAY #4

## (undated) DEVICE — SET LEADWIRE 5 LEAD BEDSIDE DISPOSABLE ECG (1SET OF 5/EA)

## (undated) DEVICE — DRESSING ABDOMINAL PAD STERILE 8 X 10" (360EA/CA)"

## (undated) DEVICE — ELECTRODE 850 FOAM ADHESIVE - HYDROGEL RADIOTRNSPRNT (50/PK)

## (undated) DEVICE — SLEEVE VASO CALF MED - (10PR/CA)

## (undated) DEVICE — SUCTION INSTRUMENT YANKAUER BULBOUS TIP W/O VENT (50EA/CA)

## (undated) DEVICE — SUTURE 4-0 VICRYL PLUS FS-2 - 27 INCH (36/BX)

## (undated) DEVICE — HEAD HOLDER JUNIOR/ADULT

## (undated) DEVICE — LEAD SET 6 DISP. EKG NIHON KOHDEN

## (undated) DEVICE — SUTURE 3-0 VICRYL PLUS - 12 X 18 INCH (12/BX)

## (undated) DEVICE — SET EXTENSION WITH 2 PORTS (48EA/CA) ***PART #2C8610 IS A SUBSTITUTE*****

## (undated) DEVICE — PACK CYSTO III (2EA/CA)

## (undated) DEVICE — TUBING CLEARLINK DUO-VENT - C-FLO (48EA/CA)

## (undated) DEVICE — TUBE CONNECTING SUCTION - CLEAR PLASTIC STERILE 72 IN (50EA/CA)

## (undated) DEVICE — GLOVE BIOGEL SZ 8 SURGICAL PF LTX - (50PR/BX 4BX/CA)

## (undated) DEVICE — WATER IRRIG. STER 3000 ML - (4/CA)

## (undated) DEVICE — SPONGE GAUZESTER 4 X 4 4PLY - (128PK/CA)

## (undated) DEVICE — ELECTRODE DUAL RETURN W/ CORD - (50/PK)

## (undated) DEVICE — KIT ANESTHESIA W/CIRCUIT & 3/LT BAG W/FILTER (20EA/CA)

## (undated) DEVICE — MASK ANESTHESIA ADULT  - (100/CA)

## (undated) DEVICE — COVER LIGHT HANDLE ALC PLUS DISP (18EA/BX)

## (undated) DEVICE — DRAPE LAPAROTOMY T SHEET - (12EA/CA)

## (undated) DEVICE — HEADREST PRONEVIEW LARGE - (10/CA)

## (undated) DEVICE — SYRINGE 10 ML CONTROL LL (25EA/BX 4BX/CA)

## (undated) DEVICE — SUTURE 3-0 VICRYL PLUS SH - 8X 18 INCH (12/BX)

## (undated) DEVICE — KIT  I.V. START (100EA/CA)

## (undated) DEVICE — SUTURE 3-0 CHROMIC GUT SH 27 (36PK/BX)"

## (undated) DEVICE — SURGIFOAM (SIZE 100) - (6EA/CA)

## (undated) DEVICE — SENSOR OXIMETER ADULT SPO2 RD SET (20EA/BX)

## (undated) DEVICE — SUTURE GENERAL

## (undated) DEVICE — GOWN SURGICAL XX-LARGE - (28EA/CA) SIRUS NON REINFORCED